# Patient Record
Sex: FEMALE | Race: BLACK OR AFRICAN AMERICAN | Employment: UNEMPLOYED | URBAN - METROPOLITAN AREA
[De-identification: names, ages, dates, MRNs, and addresses within clinical notes are randomized per-mention and may not be internally consistent; named-entity substitution may affect disease eponyms.]

---

## 2017-03-22 ENCOUNTER — HOSPITAL ENCOUNTER (EMERGENCY)
Age: 52
Discharge: HOME | End: 2017-03-23
Attending: EMERGENCY MEDICINE
Payer: SELF-PAY

## 2017-03-22 VITALS
HEART RATE: 98 BPM | OXYGEN SATURATION: 95 % | TEMPERATURE: 98.7 F | RESPIRATION RATE: 16 BRPM | SYSTOLIC BLOOD PRESSURE: 120 MMHG | DIASTOLIC BLOOD PRESSURE: 78 MMHG

## 2017-03-22 DIAGNOSIS — N64.4 PAINFUL BREASTS: Primary | ICD-10-CM

## 2017-03-22 DIAGNOSIS — E87.6 HYPOKALEMIA: ICD-10-CM

## 2017-03-22 LAB
ANION GAP BLD CALC-SCNC: 10 MMOL/L (ref 3–18)
BASOPHILS # BLD AUTO: 0 K/UL (ref 0–0.1)
BASOPHILS # BLD: 0 % (ref 0–2)
BUN SERPL-MCNC: 13 MG/DL (ref 7–18)
BUN/CREAT SERPL: 17 (ref 12–20)
CALCIUM SERPL-MCNC: 9.1 MG/DL (ref 8.5–10.1)
CHLORIDE SERPL-SCNC: 99 MMOL/L (ref 100–108)
CK MB CFR SERPL CALC: NORMAL % (ref 0–4)
CK MB SERPL-MCNC: <1 NG/ML (ref 5–25)
CK SERPL-CCNC: 112 U/L (ref 26–192)
CO2 SERPL-SCNC: 27 MMOL/L (ref 21–32)
CREAT SERPL-MCNC: 0.75 MG/DL (ref 0.6–1.3)
DIFFERENTIAL METHOD BLD: NORMAL
EOSINOPHIL # BLD: 0.1 K/UL (ref 0–0.4)
EOSINOPHIL NFR BLD: 2 % (ref 0–5)
ERYTHROCYTE [DISTWIDTH] IN BLOOD BY AUTOMATED COUNT: 13 % (ref 11.6–14.5)
GLUCOSE SERPL-MCNC: 264 MG/DL (ref 74–99)
HCT VFR BLD AUTO: 42 % (ref 35–45)
HGB BLD-MCNC: 14.3 G/DL (ref 12–16)
LYMPHOCYTES # BLD AUTO: 43 % (ref 21–52)
LYMPHOCYTES # BLD: 2.2 K/UL (ref 0.9–3.6)
MCH RBC QN AUTO: 31 PG (ref 24–34)
MCHC RBC AUTO-ENTMCNC: 34 G/DL (ref 31–37)
MCV RBC AUTO: 90.9 FL (ref 74–97)
MONOCYTES # BLD: 0.3 K/UL (ref 0.05–1.2)
MONOCYTES NFR BLD AUTO: 6 % (ref 3–10)
NEUTS SEG # BLD: 2.5 K/UL (ref 1.8–8)
NEUTS SEG NFR BLD AUTO: 49 % (ref 40–73)
PLATELET # BLD AUTO: 214 K/UL (ref 135–420)
PMV BLD AUTO: 10.7 FL (ref 9.2–11.8)
POTASSIUM SERPL-SCNC: 2.9 MMOL/L (ref 3.5–5.5)
RBC # BLD AUTO: 4.62 M/UL (ref 4.2–5.3)
SODIUM SERPL-SCNC: 136 MMOL/L (ref 136–145)
TROPONIN I SERPL-MCNC: <0.02 NG/ML (ref 0–0.04)
WBC # BLD AUTO: 5.1 K/UL (ref 4.6–13.2)

## 2017-03-22 PROCEDURE — 85025 COMPLETE CBC W/AUTO DIFF WBC: CPT | Performed by: EMERGENCY MEDICINE

## 2017-03-22 PROCEDURE — 99284 EMERGENCY DEPT VISIT MOD MDM: CPT

## 2017-03-22 PROCEDURE — 93005 ELECTROCARDIOGRAM TRACING: CPT

## 2017-03-22 PROCEDURE — 80048 BASIC METABOLIC PNL TOTAL CA: CPT | Performed by: EMERGENCY MEDICINE

## 2017-03-22 PROCEDURE — 82550 ASSAY OF CK (CPK): CPT | Performed by: EMERGENCY MEDICINE

## 2017-03-22 PROCEDURE — 74011250637 HC RX REV CODE- 250/637: Performed by: NURSE PRACTITIONER

## 2017-03-22 RX ORDER — LANOLIN ALCOHOL/MO/W.PET/CERES
400 CREAM (GRAM) TOPICAL
Status: COMPLETED | OUTPATIENT
Start: 2017-03-22 | End: 2017-03-22

## 2017-03-22 RX ORDER — IBUPROFEN 400 MG/1
800 TABLET ORAL
Status: COMPLETED | OUTPATIENT
Start: 2017-03-22 | End: 2017-03-22

## 2017-03-22 RX ORDER — POTASSIUM CHLORIDE 20 MEQ/1
40 TABLET, EXTENDED RELEASE ORAL
Status: COMPLETED | OUTPATIENT
Start: 2017-03-22 | End: 2017-03-22

## 2017-03-22 RX ADMIN — IBUPROFEN 800 MG: 400 TABLET, FILM COATED ORAL at 23:29

## 2017-03-22 RX ADMIN — POTASSIUM CHLORIDE 40 MEQ: 1500 TABLET, EXTENDED RELEASE ORAL at 23:29

## 2017-03-22 RX ADMIN — Medication 400 MG: at 23:29

## 2017-03-23 LAB
ATRIAL RATE: 105 BPM
ATRIAL RATE: 63 BPM
CALCULATED P AXIS, ECG09: 150 DEGREES
CALCULATED P AXIS, ECG09: 69 DEGREES
CALCULATED R AXIS, ECG10: -24 DEGREES
CALCULATED R AXIS, ECG10: 42 DEGREES
CALCULATED T AXIS, ECG11: 148 DEGREES
CALCULATED T AXIS, ECG11: 66 DEGREES
DIAGNOSIS, 93000: NORMAL
DIAGNOSIS, 93000: NORMAL
P-R INTERVAL, ECG05: 166 MS
P-R INTERVAL, ECG05: 168 MS
Q-T INTERVAL, ECG07: 374 MS
Q-T INTERVAL, ECG07: 418 MS
QRS DURATION, ECG06: 84 MS
QRS DURATION, ECG06: 92 MS
QTC CALCULATION (BEZET), ECG08: 427 MS
QTC CALCULATION (BEZET), ECG08: 494 MS
VENTRICULAR RATE, ECG03: 105 BPM
VENTRICULAR RATE, ECG03: 63 BPM

## 2017-03-23 RX ORDER — POTASSIUM CHLORIDE 20 MEQ/1
20 TABLET, EXTENDED RELEASE ORAL 3 TIMES DAILY
Qty: 9 TAB | Refills: 0 | Status: SHIPPED | OUTPATIENT
Start: 2017-03-23 | End: 2017-03-26

## 2017-03-23 RX ORDER — IBUPROFEN 800 MG/1
800 TABLET ORAL EVERY 8 HOURS
Qty: 15 TAB | Refills: 0 | Status: SHIPPED | OUTPATIENT
Start: 2017-03-23 | End: 2017-03-28

## 2017-03-23 NOTE — ED PROVIDER NOTES
HPI Comments: Pt with history of bipolar d/o and anxiety presents with the CC of burning pain to her bilateral breasts x 2 weeks. Pt has been seen by PCP yesterday for same who has scheduled a mammogram. Pt denies and CP or SOA, swelling. No other complaints at this time. Past Medical History:   Diagnosis Date    Anemia     Anemia NEC     Arthritis     Asthma     Chest pain, unspecified     Possible GERD, normal NUC Stress test    Chronic diastolic heart failure (HCC)     Normal EF, DD    Essential hypertension, benign     Better controlled    Hypercholesteremia     Hypertension     Migraines     Other and unspecified hyperlipidemia     Shortness of breath     Possible CHF, asthma, COPD    Thromboembolus ([facility] [address]        Past Surgical History:   Procedure Laterality Date    HX GYN      4 c-sections         Family History:   Problem Relation Age of Onset    Heart Disease Other      positive family history for heart disease    Diabetes Mother     Cancer Mother     Cancer Father     Diabetes Sister     Diabetes Paternal Grandmother        Social History     Social History    Marital status:      Spouse name: N/A    Number of children: N/A    Years of education: N/A     Occupational History    Not on file. Social History Main Topics    Smoking status: Current Some Day Smoker    Smokeless tobacco: Not on file    Alcohol use No    Drug use: No      Comment: reports hx of crack, cocaine and thc \"5 years ago\"    Sexual activity: Not on file     Other Topics Concern    Not on file     Social History Narrative    ** Merged History Encounter **              ALLERGIES: Pcn [penicillins] and Penicillins    Review of Systems   Constitutional: Negative. Negative for chills and fever. Respiratory: Negative. Negative for cough, chest tightness, shortness of breath, wheezing and stridor. Cardiovascular: Negative. Negative for chest pain and palpitations.    Gastrointestinal: Negative. Negative for abdominal pain, diarrhea, nausea and vomiting. Musculoskeletal: Negative. Negative for back pain, gait problem, neck pain and neck stiffness. Neurological: Negative. Negative for dizziness, weakness and numbness. All other systems reviewed and are negative. Vitals:    03/22/17 2219   BP: 120/78   Pulse: 98   Resp: 16   Temp: 98.7 °F (37.1 °C)   SpO2: 95%            Physical Exam   Constitutional: She is oriented to person, place, and time. She appears well-developed and well-nourished. No distress. HENT:   Head: Normocephalic and atraumatic. Eyes: Conjunctivae and EOM are normal. Pupils are equal, round, and reactive to light. Neck: Normal range of motion. Neck supple. Cardiovascular: Normal rate, regular rhythm and normal heart sounds. Exam reveals no gallop and no friction rub. No murmur heard. Pulmonary/Chest: Effort normal and breath sounds normal. No respiratory distress. She has no wheezes. She has no rales. Bilateral breasts tendernes globally without erythema, induration, or palpable masses. No overlying rash. DOMENICO Esparza present for breast exam   Abdominal: Soft. Bowel sounds are normal. She exhibits no distension. There is no tenderness. Musculoskeletal: Normal range of motion. She exhibits no edema, tenderness or deformity. Neurological: She is alert and oriented to person, place, and time. She exhibits normal muscle tone. Coordination normal.   Skin: Skin is warm and dry. She is not diaphoretic. Psychiatric: Her behavior is normal.   Pt anxious   Nursing note and vitals reviewed. MDM  Number of Diagnoses or Management Options  Hypokalemia:   Painful breasts:   Diagnosis management comments: Initial EKG interp by Dr. Rolo Wolff- Nothing acute. Cardiac labs normal.Reiterated pt's need for mammogram. Treated her hypokalemia in ED and will write prescription. Ibuprofen for breast pain. Follow up with PCP.     Instructed pt not to take Ibuprofen and Diclofenac during the same day/treatment period         Amount and/or Complexity of Data Reviewed  Clinical lab tests: ordered and reviewed    Risk of Complications, Morbidity, and/or Mortality  Presenting problems: moderate  Diagnostic procedures: moderate  Management options: moderate    Patient Progress  Patient progress: stable    ED Course       Procedures                       Diagnosis:   1. Painful breasts    2. Hypokalemia          Disposition: home      Follow-up Information     Follow up With Details Comments [address], NP In 2 days      [facility] EM[facility]ENCY DEPT  If symptoms worsen [address] [place] [address]  [phone]          Patient's Medications   Start Taking    IBUPROFEN (MOTRIN) 800 MG TABLET    Take 1 Tab by mouth every eight (8) hours for 5 days. POTASSIUM CHLORIDE (K-DUR, KLOR-CON) 20 MEQ TABLET    Take 1 Tab by mouth three (3) times daily for 3 days. Continue Taking    ALBUTEROL (PROVENTIL VENTOLIN) 2.5 MG /3 ML (0.083 %) NEBULIZER SOLUTION    5 mg by Nebulization route once. CLONIDINE (CATAPRES) 0.1 MG TABLET    Take 0.1 mg by mouth two (2) times a day. CYCLOBENZAPRINE HCL (CYCLOBENZAPRINE PO)    Take  by mouth. FERROUS SULF-FA-B COMP WITH C (MULTIRET FOLIC) 370 (912)-7.7 MG TBER    Take 1 Tab by mouth daily. FLUTICASONE-SALMETEROL (ADVAIR) 100-50 MCG/DOSE DISKUS INHALER    Take 1 Puff by inhalation every twelve (12) hours. FUROSEMIDE (LASIX) 40 MG TABLET    Take  by mouth daily. INSULIN NPH/INSULIN REGULAR (NOVOLIN 70/30, HUMULIN 70/30) 100 UNIT/ML (70-30) INJECTION    by SubCUTAneous route. LISINOPRIL (PRINIVIL, ZESTRIL) 5 MG TABLET    Take  by mouth daily. METFORMIN (GLUCOPHAGE) 500 MG TABLET    Take 1 Tab by mouth daily (with breakfast). OLMESARTAN-AMLODIPINE-HCTZ (TRIBENZOR) 40-5-12.5 MG TAB    Take  by mouth.     OXYCODONE-ACETAMINOPHEN (PERCOCET) 5-325 MG PER TABLET    Take 1 Tab by mouth every four (4) hours as needed for Pain. PREDNISONE (STERAPRED) 5 MG DOSE PACK    See administration instruction per 5mg dose pack    SIMVASTATIN PO    Take  by mouth. These Medications have changed    No medications on file   Stop Taking    DICLOFENAC POTASSIUM (CATAFLAM) 50 MG TABLET    Take 50 mg by mouth three (3) times daily.

## 2017-03-23 NOTE — DISCHARGE INSTRUCTIONS
Breast Pain: Care Instructions  Your Care Instructions  Breast tenderness and pain may come and go with your monthly periods (cyclic), or it may not follow any pattern (noncyclic). Breast pain is rarely caused by a serious health problem. You may need tests to find the cause. Follow-up care is a key part of your treatment and safety. Be sure to make and go to all appointments, and call your doctor if you are having problems. Its also a good idea to know your test results and keep a list of the medicines you take. How can you care for yourself at home? · If your doctor gave you medicine, take it exactly as prescribed. Call your doctor if you think you are having a problem with your medicine. · Take an over-the-counter pain medicine, such as acetaminophen (Tylenol), ibuprofen (Advil, Motrin), or naproxen (Aleve), to relieve pain and swelling. Read and follow all instructions on the label. · Do not take two or more pain medicines at the same time unless the doctor told you to. Many pain medicines have acetaminophen, which is Tylenol. Too much acetaminophen (Tylenol) can be harmful. · Wear a supportive bra, such as a sports bra or a jog bra. · Cut down on the amount of fat in your diet. If you need help planning healthy meals, see a dietitian. · Get at least 30 minutes of exercise on most days of the week. Walking is a good choice. You also may want to do other activities, such as running, swimming, cycling, or playing tennis or team sports. · Keep a healthy sleep pattern. Go to bed at the same time every night, and get up at the same time every day. When should you call for help? Call your doctor now or seek immediate medical care if:  · You have new changes in a breast, such as:  ¨ A lump or thickening in your breast or armpit. ¨ A change in the breast's size or shape. ¨ Skin changes, such as dimples or puckers. ¨ Nipple discharge.   ¨ A change in the color or feel of the skin of your breast or the darker area around the nipple (areola). ¨ A change in the shape of the nipple (it may look like it's being pulled into the breast). · You have symptoms of a breast infection, such as:  ¨ Increased pain, swelling, redness, or warmth around a breast.  ¨ Red streaks extending from the breast.  ¨ Pus draining from a breast.  ¨ A fever. Watch closely for changes in your health, and be sure to contact your doctor if:  · Your breast pain does not get better after 1 week. · You have a lump or thickening in your breast or armpit. Where can you learn more? Go to http://rashawn-bre.info/. Enter P301 in the search box to learn more about \"Breast Pain: Care Instructions. \"  Current as of: May 27, 2016  Content Version: 11.1  © 9604-5161 [employer]. Care instructions adapted under license by [employer] (which disclaims liability or warranty for this information). If you have questions about a medical condition or this instruction, always ask your healthcare professional. [place] [address] any warranty or liability for your use of this information. Mammogram: About This Test  What is it? A mammogram is an X-ray of the breast that is used to screen for breast cancer. This test can find tumors that are too small for you or your doctor to feel. Cancer is most easily treated and cured when it is found at an early stage. Why is this test done? A mammogram is done to:  · Look for breast cancer in women who don't have symptoms. · Find breast cancer in women who have symptoms. Symptoms of breast cancer may include a lump or thickening in the breast, nipple discharge, or dimpling of the skin on one area of the breast.  · Find an area of suspicious breast tissue to remove for an exam under a microscope (biopsy). How can you prepare for the test?  · Tell your doctor if you:  ¨ Are or might be pregnant. ¨ Are breastfeeding. ¨ Have breast implants.   ¨ Have previously had a breast biopsy. · On the day of the test, don't use any deodorant, perfume, powders, or ointments. What happens before the test?  · You will need to take off any jewelry that might interfere with the X-ray pictures. · You will need to take off your clothes above the waist.  · You will be given a cloth or paper gown to use during the test.  What happens during the test?  · You usually stand during a mammogram.  · One at a time, your breasts will be placed on a flat plate that contains the X-ray film. · Another plate is then pressed firmly against your breast to help flatten out the breast tissue. You may be asked to lift your arm. · For a few seconds while the X-ray picture is being taken, you will need to hold your breath. · At least two pictures are taken of each breast. One is taken from the top and one from the side. What else should you know about the test?  · The X-ray plate will feel cold when you place your breast on it. Having your breasts flattened and squeezed isn't comfortable. But it is necessary to flatten out the breast tissue to get the best pictures. · Mammograms do not prevent breast cancer or reduce a woman's risk of developing cancer. · Most things that are found during a mammogram are not breast cancer. How long does the test take? · The test will take about 10 to 15 minutes. You may be in the clinic for up to an hour. What happens after the test?  · You will probably be able to go home right away. · You can go back to your usual activities right away. Follow-up care is a key part of your treatment and safety. Be sure to make and go to all appointments, and call your doctor if you are having problems. It's also a good idea to keep a list of the medicines you take. Ask your doctor when you can expect to have your test results. Where can you learn more? Go to http://rashawn-bre.info/.   Enter K480 in the search box to learn more about \"Mammogram: About This Test.\"  Current as of: July 26, 2016  Content Version: 11.1  © 2158-7540 [employer]. Care instructions adapted under license by [employer] (which disclaims liability or warranty for this information). If you have questions about a medical condition or this instruction, always ask your healthcare professional. [place] [address] any warranty or liability for your use of this information.

## 2018-04-17 ENCOUNTER — APPOINTMENT (OUTPATIENT)
Dept: CT IMAGING | Age: 53
DRG: 065 | End: 2018-04-17
Attending: EMERGENCY MEDICINE
Payer: SELF-PAY

## 2018-04-17 ENCOUNTER — HOSPITAL ENCOUNTER (INPATIENT)
Age: 53
LOS: 2 days | Discharge: HOME | DRG: 065 | End: 2018-04-19
Attending: EMERGENCY MEDICINE | Admitting: INTERNAL MEDICINE
Payer: SELF-PAY

## 2018-04-17 ENCOUNTER — APPOINTMENT (OUTPATIENT)
Dept: MRI IMAGING | Age: 53
DRG: 065 | End: 2018-04-17
Attending: INTERNAL MEDICINE
Payer: SELF-PAY

## 2018-04-17 DIAGNOSIS — I63.9 CEREBROVASCULAR ACCIDENT (CVA), UNSPECIFIED MECHANISM (HCC): Primary | ICD-10-CM

## 2018-04-17 PROBLEM — R53.1 RIGHT SIDED WEAKNESS: Status: ACTIVE | Noted: 2018-04-17

## 2018-04-17 PROBLEM — I10 ESSENTIAL HYPERTENSION: Status: ACTIVE | Noted: 2018-04-17

## 2018-04-17 PROBLEM — E11.65 TYPE 2 DIABETES MELLITUS WITH HYPERGLYCEMIA (HCC): Status: ACTIVE | Noted: 2018-04-17

## 2018-04-17 LAB
ANION GAP SERPL CALC-SCNC: 7 MMOL/L (ref 3–18)
ATRIAL RATE: 88 BPM
BASOPHILS # BLD: 0 K/UL (ref 0–0.06)
BASOPHILS NFR BLD: 0 % (ref 0–2)
BUN SERPL-MCNC: 14 MG/DL (ref 7–18)
BUN/CREAT SERPL: 17 (ref 12–20)
CALCIUM SERPL-MCNC: 9.6 MG/DL (ref 8.5–10.1)
CALCULATED P AXIS, ECG09: 58 DEGREES
CALCULATED R AXIS, ECG10: 4 DEGREES
CALCULATED T AXIS, ECG11: 79 DEGREES
CHLORIDE SERPL-SCNC: 105 MMOL/L (ref 100–108)
CO2 SERPL-SCNC: 29 MMOL/L (ref 21–32)
CREAT SERPL-MCNC: 0.82 MG/DL (ref 0.6–1.3)
DIAGNOSIS, 93000: NORMAL
DIFFERENTIAL METHOD BLD: NORMAL
EOSINOPHIL # BLD: 0 K/UL (ref 0–0.4)
EOSINOPHIL NFR BLD: 1 % (ref 0–5)
ERYTHROCYTE [DISTWIDTH] IN BLOOD BY AUTOMATED COUNT: 14.5 % (ref 11.6–14.5)
GLUCOSE BLD STRIP.AUTO-MCNC: 132 MG/DL (ref 70–110)
GLUCOSE BLD STRIP.AUTO-MCNC: 143 MG/DL (ref 70–110)
GLUCOSE BLD STRIP.AUTO-MCNC: 183 MG/DL (ref 70–110)
GLUCOSE SERPL-MCNC: 182 MG/DL (ref 74–99)
HCT VFR BLD AUTO: 41.8 % (ref 35–45)
HGB BLD-MCNC: 15 G/DL (ref 12–16)
INR PPP: 0.9 (ref 0.8–1.2)
LYMPHOCYTES # BLD: 2.3 K/UL (ref 0.9–3.6)
LYMPHOCYTES NFR BLD: 39 % (ref 21–52)
MCH RBC QN AUTO: 31.6 PG (ref 24–34)
MCHC RBC AUTO-ENTMCNC: 35.9 G/DL (ref 31–37)
MCV RBC AUTO: 88 FL (ref 74–97)
MONOCYTES # BLD: 0.2 K/UL (ref 0.05–1.2)
MONOCYTES NFR BLD: 4 % (ref 3–10)
NEUTS SEG # BLD: 3.3 K/UL (ref 1.8–8)
NEUTS SEG NFR BLD: 56 % (ref 40–73)
P-R INTERVAL, ECG05: 160 MS
PLATELET # BLD AUTO: 248 K/UL (ref 135–420)
PMV BLD AUTO: 10 FL (ref 9.2–11.8)
POTASSIUM SERPL-SCNC: 4.1 MMOL/L (ref 3.5–5.5)
PROTHROMBIN TIME: 12.1 SEC (ref 11.5–15.2)
Q-T INTERVAL, ECG07: 398 MS
QRS DURATION, ECG06: 82 MS
QTC CALCULATION (BEZET), ECG08: 481 MS
RBC # BLD AUTO: 4.75 M/UL (ref 4.2–5.3)
SODIUM SERPL-SCNC: 141 MMOL/L (ref 136–145)
VENTRICULAR RATE, ECG03: 88 BPM
WBC # BLD AUTO: 5.8 K/UL (ref 4.6–13.2)

## 2018-04-17 PROCEDURE — 74011250637 HC RX REV CODE- 250/637: Performed by: EMERGENCY MEDICINE

## 2018-04-17 PROCEDURE — 65660000000 HC RM CCU STEPDOWN

## 2018-04-17 PROCEDURE — 85610 PROTHROMBIN TIME: CPT | Performed by: EMERGENCY MEDICINE

## 2018-04-17 PROCEDURE — 70450 CT HEAD/BRAIN W/O DYE: CPT

## 2018-04-17 PROCEDURE — 96374 THER/PROPH/DIAG INJ IV PUSH: CPT

## 2018-04-17 PROCEDURE — 82962 GLUCOSE BLOOD TEST: CPT

## 2018-04-17 PROCEDURE — 85025 COMPLETE CBC W/AUTO DIFF WBC: CPT | Performed by: EMERGENCY MEDICINE

## 2018-04-17 PROCEDURE — 99285 EMERGENCY DEPT VISIT HI MDM: CPT

## 2018-04-17 PROCEDURE — 74011250637 HC RX REV CODE- 250/637: Performed by: INTERNAL MEDICINE

## 2018-04-17 PROCEDURE — 80048 BASIC METABOLIC PNL TOTAL CA: CPT | Performed by: EMERGENCY MEDICINE

## 2018-04-17 PROCEDURE — 93970 EXTREMITY STUDY: CPT

## 2018-04-17 PROCEDURE — 74011250636 HC RX REV CODE- 250/636: Performed by: EMERGENCY MEDICINE

## 2018-04-17 PROCEDURE — 93005 ELECTROCARDIOGRAM TRACING: CPT

## 2018-04-17 PROCEDURE — 74011250636 HC RX REV CODE- 250/636: Performed by: INTERNAL MEDICINE

## 2018-04-17 RX ORDER — SODIUM CHLORIDE 0.9 % (FLUSH) 0.9 %
5-10 SYRINGE (ML) INJECTION EVERY 8 HOURS
Status: DISCONTINUED | OUTPATIENT
Start: 2018-04-17 | End: 2018-04-19 | Stop reason: HOSPADM

## 2018-04-17 RX ORDER — SODIUM CHLORIDE 0.9 % (FLUSH) 0.9 %
5-10 SYRINGE (ML) INJECTION AS NEEDED
Status: DISCONTINUED | OUTPATIENT
Start: 2018-04-17 | End: 2018-04-19 | Stop reason: HOSPADM

## 2018-04-17 RX ORDER — MAGNESIUM SULFATE 100 %
4 CRYSTALS MISCELLANEOUS AS NEEDED
Status: DISCONTINUED | OUTPATIENT
Start: 2018-04-17 | End: 2018-04-19 | Stop reason: HOSPADM

## 2018-04-17 RX ORDER — MIDAZOLAM HYDROCHLORIDE 1 MG/ML
2 INJECTION, SOLUTION INTRAMUSCULAR; INTRAVENOUS ONCE
Status: COMPLETED | OUTPATIENT
Start: 2018-04-17 | End: 2018-04-17

## 2018-04-17 RX ORDER — ONDANSETRON 2 MG/ML
4 INJECTION INTRAMUSCULAR; INTRAVENOUS
Status: DISCONTINUED | OUTPATIENT
Start: 2018-04-17 | End: 2018-04-19 | Stop reason: HOSPADM

## 2018-04-17 RX ORDER — DOCUSATE SODIUM 100 MG/1
100 CAPSULE, LIQUID FILLED ORAL 2 TIMES DAILY
Status: DISCONTINUED | OUTPATIENT
Start: 2018-04-17 | End: 2018-04-19 | Stop reason: HOSPADM

## 2018-04-17 RX ORDER — ASPIRIN 325 MG
325 TABLET ORAL DAILY
Status: DISCONTINUED | OUTPATIENT
Start: 2018-04-18 | End: 2018-04-19 | Stop reason: HOSPADM

## 2018-04-17 RX ORDER — FAMOTIDINE 20 MG/1
20 TABLET, FILM COATED ORAL EVERY 12 HOURS
Status: DISCONTINUED | OUTPATIENT
Start: 2018-04-17 | End: 2018-04-19 | Stop reason: HOSPADM

## 2018-04-17 RX ORDER — ALBUTEROL SULFATE 0.83 MG/ML
5 SOLUTION RESPIRATORY (INHALATION)
Status: DISCONTINUED | OUTPATIENT
Start: 2018-04-17 | End: 2018-04-19 | Stop reason: HOSPADM

## 2018-04-17 RX ORDER — HEPARIN SODIUM 5000 [USP'U]/ML
5000 INJECTION, SOLUTION INTRAVENOUS; SUBCUTANEOUS EVERY 8 HOURS
Status: DISCONTINUED | OUTPATIENT
Start: 2018-04-17 | End: 2018-04-19 | Stop reason: HOSPADM

## 2018-04-17 RX ORDER — SODIUM CHLORIDE 9 MG/ML
125 INJECTION, SOLUTION INTRAVENOUS CONTINUOUS
Status: DISCONTINUED | OUTPATIENT
Start: 2018-04-17 | End: 2018-04-19

## 2018-04-17 RX ORDER — ASPIRIN 325 MG
325 TABLET ORAL
Status: COMPLETED | OUTPATIENT
Start: 2018-04-17 | End: 2018-04-17

## 2018-04-17 RX ORDER — DEXTROSE 50 % IN WATER (D50W) INTRAVENOUS SYRINGE
25-50 AS NEEDED
Status: DISCONTINUED | OUTPATIENT
Start: 2018-04-17 | End: 2018-04-19 | Stop reason: HOSPADM

## 2018-04-17 RX ORDER — ACETAMINOPHEN 650 MG/1
650 SUPPOSITORY RECTAL
Status: DISCONTINUED | OUTPATIENT
Start: 2018-04-17 | End: 2018-04-19 | Stop reason: HOSPADM

## 2018-04-17 RX ORDER — LABETALOL HCL 20 MG/4 ML
5 SYRINGE (ML) INTRAVENOUS
Status: DISCONTINUED | OUTPATIENT
Start: 2018-04-17 | End: 2018-04-19 | Stop reason: HOSPADM

## 2018-04-17 RX ORDER — SODIUM CHLORIDE 9 MG/ML
500 INJECTION, SOLUTION INTRAVENOUS ONCE
Status: ACTIVE | OUTPATIENT
Start: 2018-04-17 | End: 2018-04-18

## 2018-04-17 RX ORDER — ACETAMINOPHEN 325 MG/1
650 TABLET ORAL
Status: DISCONTINUED | OUTPATIENT
Start: 2018-04-17 | End: 2018-04-19 | Stop reason: HOSPADM

## 2018-04-17 RX ORDER — ATORVASTATIN CALCIUM 40 MG/1
80 TABLET, FILM COATED ORAL
Status: DISCONTINUED | OUTPATIENT
Start: 2018-04-17 | End: 2018-04-19 | Stop reason: HOSPADM

## 2018-04-17 RX ORDER — INSULIN LISPRO 100 [IU]/ML
INJECTION, SOLUTION INTRAVENOUS; SUBCUTANEOUS
Status: DISCONTINUED | OUTPATIENT
Start: 2018-04-17 | End: 2018-04-19 | Stop reason: HOSPADM

## 2018-04-17 RX ADMIN — FAMOTIDINE 20 MG: 20 TABLET, FILM COATED ORAL at 23:23

## 2018-04-17 RX ADMIN — ASPIRIN 325 MG: 325 TABLET ORAL at 09:46

## 2018-04-17 RX ADMIN — MIDAZOLAM HYDROCHLORIDE 2 MG: 1 INJECTION, SOLUTION INTRAMUSCULAR; INTRAVENOUS at 09:35

## 2018-04-17 RX ADMIN — Medication 10 ML: at 16:25

## 2018-04-17 RX ADMIN — SODIUM CHLORIDE 125 ML/HR: 900 INJECTION, SOLUTION INTRAVENOUS at 16:14

## 2018-04-17 RX ADMIN — ATORVASTATIN CALCIUM 80 MG: 40 TABLET, FILM COATED ORAL at 23:23

## 2018-04-17 RX ADMIN — HEPARIN SODIUM 5000 UNITS: 5000 INJECTION, SOLUTION INTRAVENOUS; SUBCUTANEOUS at 16:25

## 2018-04-17 NOTE — ROUTINE PROCESS
Primary Nurse Martha Holbrook RN and Valentino Hurst, RN performed a dual skin assessment on this patient No impairment noted  Baron score is 20

## 2018-04-17 NOTE — H&P
History & Physical    Patient: Marily León MRN: [id number]  CSN: [id number]    YOB: 1965  Age: 45 y.o. Sex: female      DOA: 4/17/2018    Chief Complaint:   Chief Complaint   Patient presents with    Hand Pain    Arm Pain          HPI:     Marily León is a 45 y.o.  female who has PMH of HTN and DM was in her normal state of health when she woke up to use the bathroom but felt extremely weak and fell to her Rt sided and lost control of her bladder. Pt states that she crawled back to bed and her  called 911. As per Pt and , Pt had U&L ext weakness, slurred speech and inability to express herself.    In ER, Pt gained her speech back but continues to Have Upper Ext weakness and worse LE weakness with tenderness on passive MVT     Denies CP/SOB/fever/abdominal pain and urinary Sx but continues to c/o pain and weakness Rt U&L ext      Past Medical History:   Diagnosis Date    Anemia     Anemia NEC     Arthritis     Asthma     Chest pain, unspecified     Possible GERD, normal NUC Stress test    Chronic diastolic heart failure (HCC)     Normal EF, DD    Essential hypertension, benign     Better controlled    Hypercholesteremia     Hypertension     Migraines     Other and unspecified hyperlipidemia     Shortness of breath     Possible CHF, asthma, COPD    Thromboembolus ([address]        Past Surgical History:   Procedure Laterality Date    HX GYN      4 c-sections       Family History   Problem Relation Age of Onset    Heart Disease Other      positive family history for heart disease    Diabetes Mother     Cancer Mother     Cancer Father     Diabetes Sister     Diabetes Paternal Grandmother        Social History     Social History    Marital status: SINGLE     Spouse name: N/A    Number of children: N/A    Years of education: N/A     Social History Main Topics    Smoking status: Current Some Day Smoker    Smokeless tobacco: Not on file   Jeffrey Alcohol use No    Drug use: No      Comment: reports hx of crack, cocaine and thc \"5 years ago\"    Sexual activity: Not on file     Other Topics Concern    Not on file     Social History Narrative    ** Merged History Encounter **            Prior to Admission medications    Medication Sig Start Date End Date Taking? Authorizing Provider   CYCLOBENZAPRINE HCL (CYCLOBENZAPRINE PO) Take  by mouth. Yes Historical Provider   SIMVASTATIN PO Take  by mouth. Yes Historical Provider   fluticasone-salmeterol (ADVAIR) 100-50 mcg/dose diskus inhaler Take 1 Puff by inhalation every twelve (12) hours. Yes Historical Provider   insulin NPH/insulin regular (NOVOLIN 70/30, HUMULIN 70/30) 100 unit/mL (70-30) injection by SubCUTAneous route. Yes Historical Provider   metFORMIN (GLUCOPHAGE) 500 mg tablet Take 1 Tab by mouth daily (with breakfast). 4/12/15  Yes Violeta May MD   Olmesartan-Amlodipine-HCTZ [facility]) 40-5-12.5 mg Tab Take  by mouth. Yes Mora Morelos MD   predniSONE (STERAPRED) 5 mg dose pack See administration instruction per 5mg dose pack 10/27/13  Yes MATT Rivera   lisinopril (PRINIVIL, ZESTRIL) 5 mg tablet Take  by mouth daily. Yes Historical Provider   furosemide (LASIX) 40 mg tablet Take  by mouth daily. Yes Historical Provider   ferrous sulf-fa-b comp with c (MULTIRET FOLIC) 787 (709)-4.6 mg TbER Take 1 Tab by mouth daily. 1/9/13  Yes Zayra Johnson MD   albuterol (PROVENTIL VENTOLIN) 2.5 mg /3 mL (0.083 %) nebulizer solution 5 mg by Nebulization route once. Yes Mora Morelos MD   cloNIDine (CATAPRES) 0.1 mg tablet Take 0.1 mg by mouth two (2) times a day. Yes Mora Morelos MD   oxyCODONE-acetaminophen (PERCOCET) 5-325 mg per tablet Take 1 Tab by mouth every four (4) hours as needed for Pain.  7/11/14   Macknezie Perez MD       Allergies   Allergen Reactions    Pcn [Penicillins] Hives and Swelling    Penicillins Hives         Review of Systems  GENERAL: Patient alert, awake and oriented times 3, able to communicate full sentences . Claims difficulty finding words  HEENT: No change in vision, no earache, tinnitus, sore throat or sinus congestion. NECK: No pain or stiffness. PULMONARY: No shortness of breath, cough or wheeze. Cardiovascular: no pnd / orthopnea, no CP  GASTROINTESTINAL: No abdominal pain, nausea, vomiting or diarrhea, melena or bright red blood per rectum. GENITOURINARY: No urinary frequency, urgency, hesitancy or dysuria. MUSCULOSKELETAL: as stated in HPI  DERMATOLOGIC: No rash, no itching, no lesions. ENDOCRINE: No polyuria, polydipsia, no heat or cold intolerance. No recent change in weight. HEMATOLOGICAL: No anemia or easy bruising or bleeding. NEUROLOGIC: +ve headache, no seizures, numbness, +ve tingling      Physical Exam:     Physical Exam:  Visit Vitals    /84 (BP 1 Location: Left arm, BP Patient Position: At rest)    Pulse 92    Temp 97.7 °F (36.5 °C)  Comment: pt arrived on unit    Resp 16    Ht 5' 2\" (1.575 m)    Wt 91.2 kg (201 lb)    SpO2 92%    BMI 36.76 kg/m2           Temp (24hrs), Av.8 °F (36.6 °C), Min:97.7 °F (36.5 °C), Max:97.8 °F (36.6 °C)             General:  Alert, cooperative, no distress, appears stated age. Head: Normocephalic, without obvious abnormality, atraumatic. Eyes:  Conjunctivae/corneas clear. PERRL, EOMs intact. Nose: Nares normal. No drainage or sinus tenderness. Neck: Supple, symmetrical, trachea midline, no adenopathy, thyroid: no enlargement, no carotid bruit and no JVD. Lungs:   Clear to auscultation bilaterally. Heart:  Regular rate and rhythm, S1, S2 normal.     Abdomen: Soft, non-tender. Bowel sounds normal.    Extremities: Rt U&L Ext weakness and tenderness    Pulses: 2+ and symmetric all extremities. Skin:  No rashes or lesions   Neurologic: AAOx3, generally weak. Mild difficulty finding words. Painful and weakness LE       Labs Reviewed:     All lab results for the last 24 hours reviewed.   CT and EKG    Procedures/imaging: see electronic medical records for all procedures/Xrays and details which were not copied into this note but were reviewed prior to creation of Plan      Assessment/Plan     Principal Problem:    CVA (cerebral vascular accident) ([facility] [address] (4/17/2018)    Active Problems:    Right sided weakness (4/17/2018)      Essential hypertension (4/17/2018)      Type 2 diabetes mellitus with hyperglycemia ([facility] [address] (4/17/2018)      Pt will be admitted to Neuro tele for Acute CVA  MRI brain  2 D Echo   mg daily and statin  Permissive HTN>> Hold BP meds  Labetalol 5 mg IV PRN  Neuro consult  SLP evel and treat   Lipid panel  Will get Drug screen     Will get LE doppler to r/o DVT considering Pt 's pain  DM >> will hold home meds >> SSI      DVT/GI Prophylaxis: Hep SQ    Plan of care is discussed in details with Patient/Family at bedside and agreed upon    Elsy Torres MD  4/17/2018 11:48 AM

## 2018-04-17 NOTE — PROGRESS NOTES
MRI Safety Screening form needs to be filled out and faxed to [phone] MRI can be scheduled. If unable to acquire information from pt, [facility] must be contacted.  If pt is claustrophobic or will need pain meds, please have ordered in advance to help facilitate MRI exam.

## 2018-04-17 NOTE — ROUTINE PROCESS
Pt stated she could not move her right leg but could wiggle her toes during the previous NIH assessments and upon admission assessment. Upon entering pt room found pt moving from a laying to sitting position then back to laying on her side and asked her to raise her right leg. Pt raised the leg and held it up for approx 10 seconds. Will continue to monitor pt.

## 2018-04-17 NOTE — PROGRESS NOTES
responded to Code for  Ga Robles, who is a 45 y.o.,female,     The  provided the following Interventions:  Micheljazmin Gimenez responded to code S. Patient had been moved from ED to another area of the hospital for further medical evaluation. Patient's chart was reviewed. Patient was not found in the ED.  spoke with an acquaintance of the patient and encouraged him to call for support as needed by him or the patient. Assessment:  There are no known spiritual or Restorationist issues which require intervention at this time. Plan:  Chaplains will continue to follow and will provide pastoral care on an as needed/requested basis.  recommends bedside caregivers page  on duty if patient shows signs of acute spiritual or emotional distress.      [address] [place] [address]  Spiritual Care  [phone]

## 2018-04-17 NOTE — IP AVS SNAPSHOT
[address] [place] [address] Karina Velazquez Patient: Génesis Barrera MRN: [id number] SRB:[birth year]0350 A check myke indicates which time of day the medication should be taken. My Medications START taking these medications Instructions Each Dose to Equal  
 Morning Noon Evening Bedtime  
 aspirin 325 mg tablet Commonly known as:  ASPIRIN Start taking on:  4/20/2018 Your last dose was: Your next dose is: Take 1 Tab by mouth daily. 325 mg CONTINUE taking these medications Instructions Each Dose to Equal  
 Morning Noon Evening Bedtime  
 albuterol 2.5 mg /3 mL (0.083 %) nebulizer solution Commonly known as:  PROVENTIL VENTOLIN Your last dose was: Your next dose is:    
   
   
 5 mg by Nebulization route once. 5 mg CYCLOBENZAPRINE PO Your last dose was: Your next dose is: Take  by mouth. ferrous sulf-fa-b comp with c 525 (687)-0.8 mg Brett Leyva Commonly known as:  Key Officer Your last dose was: Your next dose is: Take 1 Tab by mouth daily. 1 Tab  
    
   
   
   
  
 fluticasone-salmeterol 100-50 mcg/dose diskus inhaler Commonly known as:  ADVAIR Your last dose was: Your next dose is: Take 1 Puff by inhalation every twelve (12) hours. 1 Puff  
    
   
   
   
  
 insulin NPH/insulin regular 100 unit/mL (70-30) injection Commonly known as:  NOVOLIN 70/30, HUMULIN 70/30 Your last dose was: Your next dose is:    
   
   
 by SubCUTAneous route. LASIX 40 mg tablet Generic drug:  furosemide Your last dose was: Your next dose is: Take  by mouth daily. lisinopril 5 mg tablet Commonly known as:  Mike Zuniga  
   
 Your last dose was: Your next dose is: Take  by mouth daily. metFORMIN 500 mg tablet Commonly known as:  GLUCOPHAGE Your last dose was: Your next dose is: Take 1 Tab by mouth daily (with breakfast). 500 mg  
    
   
   
   
  
 oxyCODONE-acetaminophen 5-325 mg per tablet Commonly known as:  PERCOCET Your last dose was: Your next dose is: Take 1 Tab by mouth every four (4) hours as needed for Pain. 1 Tab  
    
   
   
   
  
 SIMVASTATIN PO Your last dose was: Your next dose is: Take  by mouth. TRIBENZOR 40-5-12.5 mg Tab Generic drug:  Olmesartan-amLODIPine-HCTZ Your last dose was: Your next dose is: Take  by mouth. STOP taking these medications   
 cloNIDine HCl 0.1 mg tablet Commonly known as:  CATAPRES  
   
  
 predniSONE 5 mg dose pack Commonly known as:  STERAPRED Where to Get Your Medications Information on where to get these meds will be given to you by the nurse or doctor. ! Ask your nurse or doctor about these medications  
  aspirin 325 mg tablet

## 2018-04-17 NOTE — ED TRIAGE NOTES
Patient arrived from home c/o right arm pain and stiffness. Patient states this happened before and received injections. Per EMS patient negative for NIH stroke scale. Patient allergies up to date. Patient ambulates without assistance.

## 2018-04-17 NOTE — PROCEDURES
[facility]  *** [facility]AL [facility] ***    Name: Rochel Dandy  MRN: [id number]    Inpatient  : 15 Jul 1965  HIS Order #: [id number]  [id number] [facility] Visit #: [id number]  Date: 2018    TYPE OF TEST: Peripheral Venous Testing    REASON FOR TEST  Pain in limb, Limb Pain    Right Leg:-  Deep venous thrombosis:           No  Superficial venous thrombosis:    No  Deep venous insufficiency:        Not examined  Superficial venous insufficiency: Not examined    Left Leg:-  Deep venous thrombosis:           No  Superficial venous thrombosis:    No  Deep venous insufficiency:        Not examined  Superficial venous insufficiency: Not examined      INTERPRETATION/FINDINGS  Duplex images were obtained using 2-D gray scale, color flow, and  spectral Doppler analysis. Right leg :  1. No evidence of deep venous thrombosis detected in the veins  visualized. 2.Deep vein(s) visualized include the common femoral, femoral,  popliteal( posterior tibial and peroneal veins. 3. Superficial vein(s) visualized include the great saphenous vein at  the sapheno-femoral junction. 4. No evidence of superficial thrombosis detected. Left leg :  1. No evidence of deep venous thrombosis detected in the veins  visualized. 2. Deep vein(s) visualized include the common femoral, femoral,  popliteal, posterior tibial, and peroneal veins. 3. Unable to perform adequate compression analysis of the peroneal  veins secondary to body habitus. Patency of vessels demonstrated with  color flow and Doppler signal. However, cannot rule out non-occlusive  deep venous thrombosis at noted vein segment. 4. No evidence of superficial thrombosis detected. 5. Superficial vein(s) visualized include the great saphenous vein at  the sapheno-femoral junction. ADDITIONAL COMMENTS  No previous exam available for comparison. I have personally reviewed the data relevant to the interpretation of  this  study. TECHNOLOGIST: Jany Fonseca.  Chica Orosco  Signed: 04/17/2018 05:24 PM    PHYSICIAN: Annie Rubin MD  Signed: 04/17/2018 06:58 PM

## 2018-04-17 NOTE — ROUTINE PROCESS
Bedside and Verbal shift change report given to [facility] (oncoming nurse) by Barrera Polk (offgoing nurse). Report included the following information SBAR, Kardex, ED Summary, Intake/Output and MAR.

## 2018-04-17 NOTE — IP AVS SNAPSHOT
[id number] [address] Patient: James Rader MRN: [id number] HHL:[id number] About your hospitalization You were admitted on:  April 17, 2018 You last received care in the:  [facility] [address] You were discharged on:  April 19, 2018 Why you were hospitalized Your primary diagnosis was:  Cva (Cerebral Vascular Accident) (Hcc) Your diagnoses also included:  Right Sided Weakness, Essential Hypertension, Type 2 Diabetes Mellitus With Hyperglycemia (Hcc) Follow-up Information Follow up With Details Comments Contact Info Reanta Lopez MD On 4/23/2018 Mrs Kacy Veloz is only in on Saturday. Please call to be schedule for a same day follow up because nothing is available until June. [address] [address] 
[phone] Discharge Orders None A check myke indicates which time of day the medication should be taken. My Medications START taking these medications Instructions Each Dose to Equal  
 Morning Noon Evening Bedtime  
 aspirin 325 mg tablet Commonly known as:  ASPIRIN Start taking on:  4/20/2018 Your last dose was: Your next dose is: Take 1 Tab by mouth daily. 325 mg CONTINUE taking these medications Instructions Each Dose to Equal  
 Morning Noon Evening Bedtime  
 albuterol 2.5 mg /3 mL (0.083 %) nebulizer solution Commonly known as:  PROVENTIL VENTOLIN Your last dose was: Your next dose is:    
   
   
 5 mg by Nebulization route once. 5 mg CYCLOBENZAPRINE PO Your last dose was: Your next dose is: Take  by mouth. ferrous sulf-fa-b comp with c 525 (434)-0.8 mg Gayleen Rising Commonly known as:  Ben Battiest Your last dose was: Your next dose is: Take 1 Tab by mouth daily. 1 Tab  
    
   
   
   
  
 fluticasone-salmeterol 100-50 mcg/dose diskus inhaler Commonly known as:  ADVAIR Your last dose was: Your next dose is: Take 1 Puff by inhalation every twelve (12) hours. 1 Puff  
    
   
   
   
  
 insulin NPH/insulin regular 100 unit/mL (70-30) injection Commonly known as:  NOVOLIN 70/30, HUMULIN 70/30 Your last dose was: Your next dose is:    
   
   
 by SubCUTAneous route. LASIX 40 mg tablet Generic drug:  furosemide Your last dose was: Your next dose is: Take  by mouth daily. lisinopril 5 mg tablet Commonly known as:  Darryn Belinda Your last dose was: Your next dose is: Take  by mouth daily. metFORMIN 500 mg tablet Commonly known as:  GLUCOPHAGE Your last dose was: Your next dose is: Take 1 Tab by mouth daily (with breakfast). 500 mg  
    
   
   
   
  
 oxyCODONE-acetaminophen 5-325 mg per tablet Commonly known as:  PERCOCET Your last dose was: Your next dose is: Take 1 Tab by mouth every four (4) hours as needed for Pain. 1 Tab  
    
   
   
   
  
 SIMVASTATIN PO Your last dose was: Your next dose is: Take  by mouth. TRIBENZOR 40-5-12.5 mg Tab Generic drug:  Olmesartan-amLODIPine-HCTZ Your last dose was: Your next dose is: Take  by mouth. STOP taking these medications   
 cloNIDine HCl 0.1 mg tablet Commonly known as:  CATAPRES  
   
  
 predniSONE 5 mg dose pack Commonly known as:  STERAPRED Where to Get Your Medications Information on where to get these meds will be given to you by the nurse or doctor. ! Ask your nurse or doctor about these medications aspirin 325 mg tablet Opioid Education Prescription Opioids: What You Need to Know: 
 
Prescription opioids can be used to help relieve moderate-to-severe pain and are often prescribed following a surgery or injury, or for certain health conditions. These medications can be an important part of treatment but also come with serious risks. Opioids are strong pain medicines. Examples include hydrocodone, oxycodone, fentanyl, and morphine. Heroin is an example of an illegal opioid. It is important to work with your health care provider to make sure you are getting the safest, most effective care. WHAT ARE THE RISKS AND SIDE EFFECTS OF OPIOID USE? Prescription opioids carry serious risks of addiction and overdose, especially with prolonged use. An opioid overdose, often marked by slow breathing, can cause sudden death. The use of prescription opioids can have a number of side effects as well, even when taken as directed. · Tolerance-meaning you might need to take more of a medication for the same pain relief · Physical dependence-meaning you have symptoms of withdrawal when the medication is stopped. Withdrawal symptoms can include nausea, sweating, chills, diarrhea, stomach cramps, and muscle aches. Withdrawal can last up to several weeks, depending on which drug you took and how long you took it. · Increased sensitivity to pain · Constipation · Nausea, vomiting, and dry mouth · Sleepiness and dizziness · Confusion · Depression · Low levels of testosterone that can result in lower sex drive, energy, and strength · Itching and sweating RISKS ARE GREATER WITH:      
· History of drug misuse, substance use disorder, or overdose · Mental health conditions (such as depression or anxiety) · Sleep apnea · Older age (72 years or older) · Pregnancy Avoid alcohol while taking prescription opioids.   Also, unless specifically advised by your health care provider, medications to avoid include: · Benzodiazepines (such as Xanax or Valium) · Muscle relaxants (such as Soma or Flexeril) · Hypnotics (such as Ambien or Lunesta) · Other prescription opioids KNOW YOUR OPTIONS Talk to your health care provider about ways to manage your pain that don't involve prescription opioids. Some of these options may actually work better and have fewer risks and side effects. Options may include: 
· Pain relievers such as acetaminophen, ibuprofen, and naproxen · Some medications that are also used for depression or seizures · Physical therapy and exercise · Counseling to help patients learn how to cope better with triggers of pain and stress. · Application of heat or cold compress · Massage therapy · Relaxation techniques Be Informed Make sure you know the name of your medication, how much and how often to take it, and its potential risks & side effects. IF YOU ARE PRESCRIBED OPIOIDS FOR PAIN: 
· Never take opioids in greater amounts or more often than prescribed. Remember the goal is not to be pain-free but to manage your pain at a tolerable level. · Follow up with your primary care provider to: · Work together to create a plan on how to manage your pain. · Talk about ways to help manage your pain that don't involve prescription opioids. · Talk about any and all concerns and side effects. · Help prevent misuse and abuse. · Never sell or share prescription opioids · Help prevent misuse and abuse. · Store prescription opioids in a secure place and out of reach of others (this may include visitors, children, friends, and family). · Safely dispose of unused/unwanted prescription opioids: Find your community drug take-back program or your pharmacy mail-back program, or flush them down the toilet, following guidance from the Food and Drug Administration (www.fda.gov/Drugs/ResourcesForYou). · Visit www.cdc.gov/drugoverdose to learn about the risks of opioid abuse and overdose. · If you believe you may be struggling with addiction, tell your health care provider and ask for guidance or call Nolberto Montanez at 9-108-255-VBRC. Discharge Instructions Patient armband removed and shredded High Blood Pressure: Care Instructions Your Care Instructions If your blood pressure is usually above 140/90, you have high blood pressure, or hypertension. That means the top number is 140 or higher or the bottom number is 90 or higher, or both. Despite what a lot of people think, high blood pressure usually doesn't cause headaches or make you feel dizzy or lightheaded. It usually has no symptoms. But it does increase your risk for heart attack, stroke, and kidney or eye damage. The higher your blood pressure, the more your risk increases. Your doctor will give you a goal for your blood pressure. Your goal will be based on your health and your age. An example of a goal is to keep your blood pressure below 140/90. Lifestyle changes, such as eating healthy and being active, are always important to help lower blood pressure. You might also take medicine to reach your blood pressure goal. 
Follow-up care is a key part of your treatment and safety. Be sure to make and go to all appointments, and call your doctor if you are having problems. It's also a good idea to know your test results and keep a list of the medicines you take. How can you care for yourself at home? Medical treatment · If you stop taking your medicine, your blood pressure will go back up. You may take one or more types of medicine to lower your blood pressure. Be safe with medicines. Take your medicine exactly as prescribed. Call your doctor if you think you are having a problem with your medicine. · Talk to your doctor before you start taking aspirin every day. Aspirin can help certain people lower their risk of a heart attack or stroke. But taking aspirin isn't right for everyone, because it can cause serious bleeding. · See your doctor regularly. You may need to see the doctor more often at first or until your blood pressure comes down. · If you are taking blood pressure medicine, talk to your doctor before you take decongestants or anti-inflammatory medicine, such as ibuprofen. Some of these medicines can raise blood pressure. · Learn how to check your blood pressure at home. Lifestyle changes · Stay at a healthy weight. This is especially important if you put on weight around the waist. Losing even 10 pounds can help you lower your blood pressure. · If your doctor recommends it, get more exercise. Walking is a good choice. Bit by bit, increase the amount you walk every day. Try for at least 30 minutes on most days of the week. You also may want to swim, bike, or do other activities. · Avoid or limit alcohol. Talk to your doctor about whether you can drink any alcohol. · Try to limit how much sodium you eat to less than 2,300 milligrams (mg) a day. Your doctor may ask you to try to eat less than 1,500 mg a day. · Eat plenty of fruits (such as bananas and oranges), vegetables, legumes, whole grains, and low-fat dairy products. · Lower the amount of saturated fat in your diet. Saturated fat is found in animal products such as milk, cheese, and meat. Limiting these foods may help you lose weight and also lower your risk for heart disease. · Do not smoke. Smoking increases your risk for heart attack and stroke. If you need help quitting, talk to your doctor about stop-smoking programs and medicines. These can increase your chances of quitting for good. When should you call for help? Call 911 anytime you think you may need emergency care.  This may mean having symptoms that suggest that your blood pressure is causing a serious heart or blood vessel problem. Your blood pressure may be over 180/110. ? For example, call 911 if: 
? · You have symptoms of a heart attack. These may include: ¨ Chest pain or pressure, or a strange feeling in the chest. 
¨ Sweating. ¨ Shortness of breath. ¨ Nausea or vomiting. ¨ Pain, pressure, or a strange feeling in the back, neck, jaw, or upper belly or in one or both shoulders or arms. ¨ Lightheadedness or sudden weakness. ¨ A fast or irregular heartbeat. ? · You have symptoms of a stroke. These may include: 
¨ Sudden numbness, tingling, weakness, or loss of movement in your face, arm, or leg, especially on only one side of your body. ¨ Sudden vision changes. ¨ Sudden trouble speaking. ¨ Sudden confusion or trouble understanding simple statements. ¨ Sudden problems with walking or balance. ¨ A sudden, severe headache that is different from past headaches. ? · You have severe back or belly pain. ?Do not wait until your blood pressure comes down on its own. Get help right away. ?Call your doctor now or seek immediate care if: 
? · Your blood pressure is much higher than normal (such as 180/110 or higher), but you don't have symptoms. ? · You think high blood pressure is causing symptoms, such as: ¨ Severe headache. ¨ Blurry vision. ? Watch closely for changes in your health, and be sure to contact your doctor if: 
? · Your blood pressure measures 140/90 or higher at least 2 times. That means the top number is 140 or higher or the bottom number is 90 or higher, or both. ? · You think you may be having side effects from your blood pressure medicine. ? · Your blood pressure is usually normal, but it goes above normal at least 2 times. Where can you learn more? Go to http://rashawn-bre.info/. Enter T164 in the search box to learn more about \"High Blood Pressure: Care Instructions. \" 
 Current as of: September 21, 2016 Content Version: 11.4 © 0689-6499 [employer]. Care instructions adapted under license by [employer] (which disclaims liability or warranty for this information). If you have questions about a medical condition or this instruction, always ask your healthcare professional. [address] any warranty or liability for your use of this information. Learning About an Ischemic Stroke What is an ischemic stroke? An ischemic (say \"sqx-YFM-gjkv\") stroke occurs when a blood clot blocks a blood vessel in the brain. This means that blood cannot flow to some part of the brain. Without blood and the oxygen it carries, this part of the brain starts to die. The part of the body controlled by the damaged area of the brain cannot work properly. This is different from a hemorrhagic (say \"fcv-irx-IM-jick\") stroke, which happens when a blood vessel in the brain has burst open or has started to leak. The brain damage from a stroke starts within minutes. Quick treatment can help limit damage to the brain and make recovery more likely. People who have had a stroke may have a hard time talking, understanding things, and making decisions. They may have to relearn daily activities, such as how to eat, bathe, and dress. How well someone recovers from a stroke depends on how quickly the person gets to the hospital, where in the brain the stroke happened, and how severe it was. Training and therapy also make a difference in how well people recover. What are the symptoms? If you have any of these symptoms, call 911 or other emergency services right away: 
· You have symptoms of a stroke. These may include: 
¨ Sudden numbness, tingling, weakness, or loss of movement in your face, arm, or leg, especially on only one side of your body. ¨ Sudden vision changes. ¨ Sudden trouble speaking. ¨ Sudden confusion or trouble understanding simple statements. ¨ Sudden problems with walking or balance. ¨ A sudden, severe headache that is different from past headaches. See your doctor if you have symptoms that seem like a stroke, even if they go away quickly. You may have had a transient ischemic attack (TIA), sometimes called a mini-stroke. A TIA is a warning that a stroke may happen soon. Getting early treatment for a TIA can help prevent a stroke. What causes an ischemic stroke? An ischemic stroke is caused by a blood clot that blocks blood flow in the brain. The most common causes of blood clots include: 
· Hardening of the arteries (atherosclerosis). This is caused by high blood pressure, diabetes, high cholesterol, or smoking. · Atrial fibrillation. How is ischemic stroke treated? You may have to take several medicines, depending on what caused your stroke. Ask your doctor if a stroke rehab program is right for you. Rehab increases your chances of getting back some of the abilities you lost. 
How can you prevent another stroke? · Work with your doctor to treat any health problems you have. High blood pressure, high cholesterol, atrial fibrillation, and diabetes all raise your chances of having a stroke. · Be safe with medicines. Take your medicine exactly as prescribed. Call your doctor if you think you are having a problem with your medicine. · Have a healthy lifestyle. ¨ Do not smoke or allow others to smoke around you. If you need help quitting, talk to your doctor about stop-smoking programs and medicines. These can increase your chances of quitting for good. Smoking makes a stroke more likely. ¨ Limit alcohol to 2 drinks a day for men and 1 drink a day for women. ¨ Lose weight if you need to. A healthy weight will help you keep your heart and body healthy. ¨ Be active. Ask your doctor what type and level of activity is safe for you. ¨ Eat heart-healthy foods, like fruits, vegetables, and high-fiber foods. Follow-up care is a key part of your treatment and safety. Be sure to make and go to all appointments, and call your doctor if you are having problems. It's also a good idea to know your test results and keep a list of the medicines you take. Where can you learn more? Go to http://rashawn-bre.info/. Enter D161 in the search box to learn more about \"Learning About an Ischemic Stroke. \" Current as of: March 20, 2017 Content Version: 11.4 © 7068-8242 [employer]. Care instructions adapted under license by [employer] (which disclaims liability or warranty for this information). If you have questions about a medical condition or this instruction, always ask your healthcare professional. [address] any warranty or liability for your use of this information. Low Sodium Diet (2,000 Milligram): Care Instructions Your Care Instructions Too much sodium causes your body to hold on to extra water. This can raise your blood pressure and force your heart and kidneys to work harder. In very serious cases, this could cause you to be put in the hospital. It might even be life-threatening. By limiting sodium, you will feel better and lower your risk of serious problems. The most common source of sodium is salt. People get most of the salt in their diet from canned, prepared, and packaged foods. Fast food and restaurant meals also are very high in sodium. Your doctor will probably limit your sodium to less than 2,000 milligrams (mg) a day. This limit counts all the sodium in prepared and packaged foods and any salt you add to your food. Follow-up care is a key part of your treatment and safety. Be sure to make and go to all appointments, and call your doctor if you are having problems. It's also a good idea to know your test results and keep a list of the medicines you take. How can you care for yourself at home? Read food labels · Read labels on cans and food packages. The labels tell you how much sodium is in each serving. Make sure that you look at the serving size. If you eat more than the serving size, you have eaten more sodium. · Food labels also tell you the Percent Daily Value for sodium. Choose products with low Percent Daily Values for sodium. · Be aware that sodium can come in forms other than salt, including monosodium glutamate (MSG), sodium citrate, and sodium bicarbonate (baking soda). MSG is often added to Asian food. When you eat out, you can sometimes ask for food without MSG or added salt. Buy low-sodium foods · Buy foods that are labeled \"unsalted\" (no salt added), \"sodium-free\" (less than 5 mg of sodium per serving), or \"low-sodium\" (less than 140 mg of sodium per serving). Foods labeled \"reduced-sodium\" and \"light sodium\" may still have too much sodium. Be sure to read the label to see how much sodium you are getting. · Buy fresh vegetables, or frozen vegetables without added sauces. Buy low-sodium versions of canned vegetables, soups, and other canned goods. Prepare low-sodium meals · Cut back on the amount of salt you use in cooking. This will help you adjust to the taste. Do not add salt after cooking. One teaspoon of salt has about 2,300 mg of sodium. · Take the salt shaker off the table. · Flavor your food with garlic, lemon juice, onion, vinegar, herbs, and spices. Do not use soy sauce, lite soy sauce, steak sauce, onion salt, garlic salt, celery salt, mustard, or ketchup on your food. · Use low-sodium salad dressings, sauces, and ketchup. Or make your own salad dressings and sauces without adding salt. · Use less salt (or none) when recipes call for it. You can often use half the salt a recipe calls for without losing flavor. Other foods such as rice, pasta, and grains do not need added salt. · Rinse canned vegetables, and cook them in fresh water. This removes some-but not all-of the salt. · Avoid water that is naturally high in sodium or that has been treated with water softeners, which add sodium. Call your local water company to find out the sodium content of your water supply. If you buy bottled water, read the label and choose a sodium-free brand. Avoid high-sodium foods · Avoid eating: ¨ Smoked, cured, salted, and canned meat, fish, and poultry. ¨ Ham, ramirez, hot dogs, and luncheon meats. ¨ Regular, hard, and processed cheese and regular peanut butter. ¨ Crackers with salted tops, and other salted snack foods such as pretzels, chips, and salted popcorn. ¨ Frozen prepared meals, unless labeled low-sodium. ¨ Canned and dried soups, broths, and bouillon, unless labeled sodium-free or low-sodium. ¨ Canned vegetables, unless labeled sodium-free or low-sodium. ¨ Western Me[employer] fries, pizza, tacos, and other fast foods. ¨ Pickles, olives, ketchup, and other condiments, especially soy sauce, unless labeled sodium-free or low-sodium. Where can you learn more? Go to http://rashawn-bre.info/. Enter C466 in the search box to learn more about \"Low Sodium Diet (2,000 Milligram): Care Instructions. \" Current as of: May 12, 2017 Content Version: 11.4 © 4618-3964 [email or web address]. Care instructions adapted under license by [employer] (which disclaims liability or warranty for this information). If you have questions about a medical condition or this instruction, always ask your healthcare professional. [address] [address] any warranty or liability for your use of this information. [email or web address] Activation Thank you for requesting access to [employer]. Please follow the instructions below to securely access and download your online medical record. [email or web address] allows you to send messages to your doctor, view your test results, renew your prescriptions, schedule appointments, and more. How Do I Sign Up? 1. In your internet browser, go to [email or web address]."[email or web address]. Med[employer]ind 
2. Click on the First Time User? Click Here link in the Sign In box. You will be redirect to the New Member Sign Up page. 3. Enter your [employer] Access Code exactly as it appears below. You will not need to use this code after youve completed the sign-up process. If you do not sign up before the expiration date, you must request a new code. [employer] Access Code: [email or web address] Expires: 2018  8:40 AM (This is the date your [employer] access code will ) 4. Enter the last four digits of your Social Security Number (xxxx) and Date of Birth (mm/dd/yyyy) as indicated and click Submit. You will be taken to the next sign-up page. 5. Create a [employer] ID. This will be your [employer] login ID and cannot be changed, so think of one that is secure and easy to remember. 6. Create a [employer] password. You can change your password at any time. 7. Enter your Password Reset Question and Answer. This can be used at a later time if you forget your password. 8. Enter your e-mail address. You will receive e-mail notification when new information is available in [address] 9. Click Sign Up. You can now view and download portions of your medical record. 10. Click the Download Summary menu link to download a portable copy of your medical information. Additional Information If you have questions, please visit the Frequently Asked Questions section of the [employer] website at [email or web address] Remember, [employer] is NOT to be used for urgent needs. For medical emergencies, dial 911. DISCHARGE SUMMARY from Nurse PATIENT INSTRUCTIONS: 
 
 
F-face looks uneven A-arms unable to move or move unevenly S-speech slurred or non-existent T-time-call 911 as soon as signs and symptoms begin-DO NOT go Back to bed or wait to see if you get better-TIME IS BRAIN. Warning Signs of HEART ATTACK Call 911 if you have these symptoms: 
? Chest discomfort. Most heart attacks involve discomfort in the center of the chest that lasts more than a few minutes, or that goes away and comes back. It can feel like uncomfortable pressure, squeezing, fullness, or pain. ? Discomfort in other areas of the upper body. Symptoms can include pain or discomfort in one or both arms, the back, neck, jaw, or stomach. ? Shortness of breath with or without chest discomfort. ? Other signs may include breaking out in a cold sweat, nausea, or lightheadedness. Don't wait more than five minutes to call [address]! Fast action can save your life. Calling 911 is almost always the fastest way to get lifesaving treatment. Emergency Medical Services staff can begin treatment when they arrive  up to an hour sooner than if someone gets to the hospital by car. The discharge information has been reviewed with the patient. The patient verbalized understanding. Discharge medications reviewed with the patient and appropriate educational materials and side effects teaching were provided. ___________________________________________________________________________________________________________________________________ [employer] Announcement We are excited to announce that we are making your provider's discharge notes available to you in mojiot. You will see these notes when they are completed and signed by the physician that discharged you from your recent hospital stay.   If you have any questions or concerns about any information you see in mojiot, please call the Health Information Department where you were seen or reach out to your Primary Care Provider for more information about your plan of care. Introducing [facility]! [facility] introduces [employer] patient portal. Now you can access parts of your medical record, email your doctor's office, and request medication refills online. 1. In your internet browser, go to [email or web address] 2. Click on the First Time User? Click Here link in the Sign In box. You will see the New Member Sign Up page. 3. Enter your [employer] Access Code exactly as it appears below. You will not need to use this code after youve completed the sign-up process. If you do not sign up before the expiration date, you must request a new code. · [employer] Access Code: [email or web address] Expires: 7/16/2018  8:40 AM 
 
4. Enter the last four digits of your Social Security Number (xxxx) and Date of Birth (mm/dd/yyyy) as indicated and click Submit. You will be taken to the next sign-up page. 5. Create a [employer] ID. This will be your [employer] login ID and cannot be changed, so think of one that is secure and easy to remember. 6. Create a [employer] password. You can change your password at any time. 7. Enter your Password Reset Question and Answer. This can be used at a later time if you forget your password. 8. Enter your e-mail address. You will receive e-mail notification when new information is available in [address] 9. Click Sign Up. You can now view and download portions of your medical record. 10. Click the Download Summary menu link to download a portable copy of your medical information. If you have questions, please visit the Frequently Asked Questions section of the [employer] website. Remember, [employer] [facility] is NOT to be used for urgent needs. For medical emergencies, dial 911. Now available from your iPhone and Android! Introducing Mike Burt As a [facility] patient, I wanted to make you aware of our electronic visit tool called Mike Burt. [employer] Company allows you to connect within minutes with a medical provider 24 hours a day, seven days a week via a mobile device or tablet or logging into a secure website from your computer. You can access [employer] from anywhere in the [place]. A virtual visit might be right for you when you have a simple condition and feel like you just dont want to get out of bed, or cant get away from work for an appointment, when your regular [employer] provider is not available (evenings, weekends or holidays), or when youre out of town and need minor care. Electronic visits cost only $49 and if the [employer] provider determines a prescription is needed to treat your condition, one can be electronically transmitted to a nearby pharmacy*. Please take a moment to enroll today if you have not already done so. The enrollment process is free and takes just a few minutes. To enroll, please download the [employer] mary to your tablet or phone, or visit [email or web address] to enroll on your computer. And, as an [address] [place] [address] patient with a [employer] account, the results of your visits will be scanned into your electronic medical record and your primary care provider will be able to view the scanned results. We urge you to continue to see your regular [employer] provider for your ongoing medical care. And while your primary care provider may not be the one available when you seek a [employer] virtual visit, the peace of mind you get from getting a real diagnosis real time can be priceless. For more information on [employer]ifin, view our Frequently Asked Questions (FAQs) at [email or web address]. Sincerely, 
 
Allegra Mcadams MD 
Chief Medical Officer Sol Haji *:  certain medications cannot be prescribed via [employer] Providers Seen During Your Hospitalization Provider Specialty Primary office phone Tracie Aparicio MD Emergency Medicine [phone] Edward Go MD Internal Medicine [phone] Your Primary Care Physician (PCP) Primary Care Physician Office Phone Office Fax UNKNOWN, PROVIDER ** None ** ** None ** You are allergic to the following Allergen Reactions Pcn (Penicillins) Hives Swelling Penicillins Hives Recent Documentation Height Weight BMI OB Status Smoking Status 1.575 m 91.3 kg 36.8 kg/m2 Having regular periods Current Some Day Smoker Emergency Contacts Name Discharge Info Relation Home Work Mobile Joel Denis CAREGIVER [3] Child [2] 21  Leonard Ruvalcaba DISCHARGE CAREGIVER [3] Boyfriend [17] [phone] Lilly Madrid DISCHARGE CAREGIVER [3] Child [2] [phone] Patient Belongings The following personal items are in your possession at time of discharge: 
  Dental Appliances: None  Visual Aid: None      Home Medications: None   Jewelry: None  Clothing: At bedside    Other Valuables: None Please provide this summary of care documentation to your next provider. Signatures-by signing, you are acknowledging that this After Visit Summary has been reviewed with you and you have received a copy. Patient Signature:  ____________________________________________________________ Date:  ____________________________________________________________  
  
[facility] Provider Signature:  ____________________________________________________________ Date:  ____________________________________________________________

## 2018-04-17 NOTE — ED PROVIDER NOTES
EMERGENCY DEPARTMENT HISTORY AND PHYSICAL EXAM    8:40 AM      Date: 4/17/2018  Patient Name: Debbie Wood    History of Presenting Illness     Chief Complaint   Patient presents with    Hand Pain    Arm Pain     History Provided By: Patient    Chief Complaint: Right sided pain/weakness  Duration: ~7 Hours  Timing:  Acute  Location: Right side  Quality: Weak  Severity: N/A  Modifying Factors: No relieving or worsening factors   Associated Symptoms: Speech change      Additional History (Context): Debbie Wood is a 45 y.o. female with PMHX of HLD, thromboembolus, arthritis, migraines, CHF, HTN, asthma, and anemia who presents via EMS with acute RUE weakness and pain, onset ~7 hours ago. Associated sxs include change in speech. Pt states she woke up ~3am to go to the bathroom and \"couldn't move her right side. \" Pt notes cough for ~1 week now. Pt states she had a normal night last night with a slight HA. No modifying or aggravating factors were reported. Denies dysuria. No other symptoms or concerns were expressed.      PCP: PROVIDER UNKNOWN        Past History     Past Medical History:  Past Medical History:   Diagnosis Date    Anemia     Anemia NEC     Arthritis     Asthma     Chest pain, unspecified     Possible GERD, normal NUC Stress test    Chronic diastolic heart failure (HCC)     Normal EF, DD    Essential hypertension, benign     Better controlled    Hypercholesteremia     Hypertension     Migraines     Other and unspecified hyperlipidemia     Shortness of breath     Possible CHF, asthma, COPD    Thromboembolus ([address]        Past Surgical History:  Past Surgical History:   Procedure Laterality Date    HX GYN      4 c-sections       Family History:  Family History   Problem Relation Age of Onset    Heart Disease Other      positive family history for heart disease    Diabetes Mother     Cancer Mother     Cancer Father     Diabetes Sister     Diabetes Paternal Grandmother        Social History:  Social History   Substance Use Topics    Smoking status: Current Some Day Smoker    Smokeless tobacco: Not on file    Alcohol use No       Allergies: Allergies   Allergen Reactions    Pcn [Penicillins] Hives and Swelling    Penicillins Hives         Review of Systems     Review of Systems   Constitutional: Negative for chills and fever. Respiratory: Positive for cough. Negative for shortness of breath. Cardiovascular: Negative for chest pain. Gastrointestinal: Negative for diarrhea, nausea and vomiting. Genitourinary: Negative for dysuria. Musculoskeletal: Positive for myalgias (RUE). Neurological: Positive for speech difficulty and weakness (right side). All other systems reviewed and are negative. Physical Exam     Visit Vitals    /84 (BP 1 Location: Left arm, BP Patient Position: At rest)    Pulse 92    Temp 97.7 °F (36.5 °C)  Comment: pt arrived on unit    Resp 16    Ht 5' 2\" (1.575 m)    Wt 91.2 kg (201 lb)    SpO2 92%    BMI 36.76 kg/m2     Physical Exam   Constitutional: She is oriented to person, place, and time. She appears well-developed and well-nourished. No distress. HENT:   Head: Normocephalic and atraumatic. Eyes: Conjunctivae and EOM are normal. Right eye exhibits no discharge. Left eye exhibits no discharge. No scleral icterus. Neck: Normal range of motion. Neck supple. No tracheal deviation present. Cardiovascular: Normal rate, regular rhythm and normal heart sounds. No murmur heard. Pulmonary/Chest: Effort normal and breath sounds normal. No respiratory distress. She has no wheezes. She has no rales. Abdominal: Soft. She exhibits no distension. There is no tenderness. There is no rebound and no guarding. Musculoskeletal: Normal range of motion. She exhibits no edema or deformity. Contracted right hand. Neurological: She is alert and oriented to person, place, and time. Dysarthric speech. RLE weakness.    Skin: Skin is warm and dry. She is not diaphoretic. Psychiatric: She has a normal mood and affect. Her behavior is normal. Judgment and thought content normal.   Nursing note and vitals reviewed. Diagnostic Study Results     Labs -  Recent Results (from the past 12 hour(s))   GLUCOSE, POC    Collection Time: 04/17/18  9:19 AM   Result Value Ref Range    Glucose (POC) 183 (H) 70 - 110 mg/dL   CBC WITH AUTOMATED DIFF    Collection Time: 04/17/18  9:24 AM   Result Value Ref Range    WBC 5.8 4.6 - 13.2 K/uL    RBC 4.75 4.20 - 5.30 M/uL    HGB 15.0 12.0 - 16.0 g/dL    HCT 41.8 35.0 - 45.0 %    MCV 88.0 74.0 - 97.0 FL    MCH 31.6 24.0 - 34.0 PG    MCHC 35.9 31.0 - 37.0 g/dL    RDW 14.5 11.6 - 14.5 %    PLATELET 994 247 - 248 K/uL    MPV 10.0 9.2 - 11.8 FL    NEUTROPHILS 56 40 - 73 %    LYMPHOCYTES 39 21 - 52 %    MONOCYTES 4 3 - 10 %    EOSINOPHILS 1 0 - 5 %    BASOPHILS 0 0 - 2 %    ABS. NEUTROPHILS 3.3 1.8 - 8.0 K/UL    ABS. LYMPHOCYTES 2.3 0.9 - 3.6 K/UL    ABS. MONOCYTES 0.2 0.05 - 1.2 K/UL    ABS. EOSINOPHILS 0.0 0.0 - 0.4 K/UL    ABS.  BASOPHILS 0.0 0.0 - 0.06 K/UL    DF AUTOMATED     METABOLIC PANEL, BASIC    Collection Time: 04/17/18  9:24 AM   Result Value Ref Range    Sodium 141 136 - 145 mmol/L    Potassium 4.1 3.5 - 5.5 mmol/L    Chloride 105 100 - 108 mmol/L    CO2 29 21 - 32 mmol/L    Anion gap 7 3.0 - 18 mmol/L    Glucose 182 (H) 74 - 99 mg/dL    BUN 14 7.0 - 18 MG/DL    Creatinine 0.82 0.6 - 1.3 MG/DL    BUN/Creatinine ratio 17 12 - 20      GFR est AA >60 >60 ml/min/1.73m2    GFR est non-AA >60 >60 ml/min/1.73m2    Calcium 9.6 8.5 - 10.1 MG/DL   PROTHROMBIN TIME + INR    Collection Time: 04/17/18  9:24 AM   Result Value Ref Range    Prothrombin time 12.1 11.5 - 15.2 sec    INR 0.9 0.8 - 1.2     EKG, 12 LEAD, INITIAL    Collection Time: 04/17/18 11:30 AM   Result Value Ref Range    Ventricular Rate 88 BPM    Atrial Rate 88 BPM    P-R Interval 160 ms    QRS Duration 82 ms    Q-T Interval 398 ms    QTC Calculation (Bezet) 481 ms    Calculated P Axis 58 degrees    Calculated R Axis 4 degrees    Calculated T Axis 79 degrees    Diagnosis       Normal sinus rhythm  Nonspecific T wave abnormality  Prolonged QT  Abnormal ECG    Confirmed by Subhash Hoyt MD, Any Pugh ([id number]) on 4/17/2018 1:11:25 PM         Radiologic Studies -   CT HEAD WO CONT   Final Result   IMPRESSION:     No evidence of intracranial hemorrhage or any other definable acute intracranial  abnormality.     There is no definable focal abnormality in brain. As read by the radiologist.          Medical Decision Making   I am the first provider for this patient. I reviewed the vital signs, available nursing notes, past medical history, past surgical history, family history and social history. Vital Signs-Reviewed the patient's vital signs. Pulse Oximetry Analysis -  95% on room air    Cardiac Monitor:   Rate: 88 bpm  Rhythm:  Normal Sinus Rhythm     EKG: Interpreted by the EP. Time Interpreted: 10:40 AM   Rate: 88 bpm   Rhythm: Normal Sinus Rhythm    Interpretation: T wave inversions in leads I and aVL. No STEMI. Records Reviewed: Nursing Notes (Time of Review: 8:40 AM)    ED Course: Progress Notes, Reevaluation, and Consults:  8:52 AM Code S called. 9:06 AM Discussed patient's history, exam, and available diagnostics results with Chencho Myrick MD, neurology, who agrees to evaluate pt.     9:34 AM Discussed patient's history, exam, and available diagnostics results with Chencho Myrick MD, neurology, who believes she had a stroke and recommends giving aspirin and admit. 10:33 AM Discussed patient's history, exam, and available diagnostics results with Vonda Raymond MD, hospitalist, who accepts admission. 10:31 Pt feeling better. Discussed results and plan to admit. Agrees with plan. Provider Notes (Medical Decision Making): Pt with stroke sxs outside of tPA window. CT negative for bleed. Treated with aspirin and admitted. Sx improving. For Hospitalized Patients:    1. Hospitalization Decision Time:  The decision to hospitalize the patient was made by Dr. David Hassan at 10:32 AM on 4/17/2018    2. Aspirin: Aspirin was given    Diagnosis     Clinical Impression:   1. Cerebrovascular accident (CVA), unspecified mechanism ([address]        Disposition: admit    Current Discharge Medication List      CONTINUE these medications which have NOT CHANGED    Details   CYCLOBENZAPRINE HCL (CYCLOBENZAPRINE PO) Take  by mouth. SIMVASTATIN PO Take  by mouth. fluticasone-salmeterol (ADVAIR) 100-50 mcg/dose diskus inhaler Take 1 Puff by inhalation every twelve (12) hours. insulin NPH/insulin regular (NOVOLIN 70/30, HUMULIN 70/30) 100 unit/mL (70-30) injection by SubCUTAneous route. metFORMIN (GLUCOPHAGE) 500 mg tablet Take 1 Tab by mouth daily (with breakfast). Qty: 30 Tab, Refills: 0      oxyCODONE-acetaminophen (PERCOCET) 5-325 mg per tablet Take 1 Tab by mouth every four (4) hours as needed for Pain. Qty: 8 Tab, Refills: 0      Olmesartan-Amlodipine-HCTZ (TRIBENZOR) 40-5-12.5 mg Tab Take  by mouth.      predniSONE (STERAPRED) 5 mg dose pack See administration instruction per 5mg dose pack  Qty: 21 Tab, Refills: 0      lisinopril (PRINIVIL, ZESTRIL) 5 mg tablet Take  by mouth daily. furosemide (LASIX) 40 mg tablet Take  by mouth daily. ferrous sulf-fa-b comp with c (MULTIRET FOLIC) 067 (089)-5.5 mg TbER Take 1 Tab by mouth daily. Qty: 90 Tab, Refills: 0      albuterol (PROVENTIL VENTOLIN) 2.5 mg /3 mL (0.083 %) nebulizer solution 5 mg by Nebulization route once.         cloNIDine (CATAPRES) 0.1 mg tablet Take 0.1 mg by mouth two (2) times a day.             _______________________________    Attestations:  Scribe [address] [place] [address] acting as a scribe for and in the presence of Tracie Aparicio MD      April 17, 2018 at 1:37 PM       Provider Attestation:      I personally performed the services described in the documentation, reviewed the documentation, as recorded by the scribe in my presence, and it accurately and completely records my words and actions.  April 17, 2018 at 1:37 PM - Donovan Mattson MD    _______________________________

## 2018-04-17 NOTE — ROUTINE PROCESS
TRANSFER - IN REPORT:    Verbal report received from VikkiRN(name) on Lizzie Soriano  being received from ED(unit) for routine progression of care      Report consisted of patients Situation, Background, Assessment and   Recommendations(SBAR). Information from the following report(s) SBAR, Kardex and ED Summary was reviewed with the receiving nurse. Opportunity for questions and clarification was provided. Assessment completed upon patients arrival to unit at 1200 and care assumed.

## 2018-04-17 NOTE — ED NOTES
TRANSFER - OUT REPORT:    Verbal report given to  Ramirez (name) on Génesis Net  being transferred to [address] [place] [address] (unit) for routine progression of care       Report consisted of patients Situation, Background, Assessment and   Recommendations(SBAR). Information from the following report(s) SBAR, ED Summary, Intake/Output and MAR was reviewed with the receiving nurse. Lines:   Peripheral IV 04/17/18 Right Antecubital (Active)   Site Assessment Clean, dry, & intact 4/17/2018  8:46 AM   Phlebitis Assessment 0 4/17/2018  8:46 AM   Infiltration Assessment 0 4/17/2018  8:46 AM   Dressing Status Clean, dry, & intact 4/17/2018  8:46 AM   Dressing Type 4 X 4 4/17/2018  8:46 AM        Opportunity for questions and clarification was provided.       Patient transported with:   Registered Nurse

## 2018-04-17 NOTE — PROGRESS NOTES
ARU/IPR REFERRAL CONTACT NOTE  [address] [place] [address] Physical Rehabilitation    RE: Eleanor Hernandez    Referral received to review this patient's case for admission to [address] [place] [address] Physical Rehabilitation. Current status reviewed.  When appropriate, will need PT/OT/ST evaluation/treatment on this patient to complete the pre-admission evaluation.  Will continue to follow. Thank you for this referral.  Should you have any questions please do not hesitate to call. Sincerely,  Medhat Gonzalez.  [address] [place] [address] Physical [facility]  [phone]

## 2018-04-18 ENCOUNTER — APPOINTMENT (OUTPATIENT)
Dept: CT IMAGING | Age: 53
DRG: 065 | End: 2018-04-18
Attending: INTERNAL MEDICINE
Payer: SELF-PAY

## 2018-04-18 LAB
AMPHET UR QL SCN: NEGATIVE
BARBITURATES UR QL SCN: NEGATIVE
BENZODIAZ UR QL: POSITIVE
CANNABINOIDS UR QL SCN: NEGATIVE
CHOLEST SERPL-MCNC: 284 MG/DL
COCAINE UR QL SCN: NEGATIVE
ERYTHROCYTE [DISTWIDTH] IN BLOOD BY AUTOMATED COUNT: 14.3 % (ref 11.6–14.5)
EST. AVERAGE GLUCOSE BLD GHB EST-MCNC: 192 MG/DL
GLUCOSE BLD STRIP.AUTO-MCNC: 144 MG/DL (ref 70–110)
GLUCOSE BLD STRIP.AUTO-MCNC: 162 MG/DL (ref 70–110)
GLUCOSE BLD STRIP.AUTO-MCNC: 165 MG/DL (ref 70–110)
GLUCOSE BLD STRIP.AUTO-MCNC: 279 MG/DL (ref 70–110)
HBA1C MFR BLD: 8.3 % (ref 4.2–5.6)
HCT VFR BLD AUTO: 41.2 % (ref 35–45)
HDLC SERPL-MCNC: 52 MG/DL (ref 40–60)
HDLC SERPL: 5.5 {RATIO} (ref 0–5)
HDSCOM,HDSCOM: ABNORMAL
HGB BLD-MCNC: 14.2 G/DL (ref 12–16)
LDLC SERPL CALC-MCNC: 182.2 MG/DL (ref 0–100)
LIPID PROFILE,FLP: ABNORMAL
MCH RBC QN AUTO: 30.9 PG (ref 24–34)
MCHC RBC AUTO-ENTMCNC: 34.5 G/DL (ref 31–37)
MCV RBC AUTO: 89.6 FL (ref 74–97)
METHADONE UR QL: NEGATIVE
OPIATES UR QL: NEGATIVE
PCP UR QL: NEGATIVE
PLATELET # BLD AUTO: 208 K/UL (ref 135–420)
PMV BLD AUTO: 10.7 FL (ref 9.2–11.8)
RBC # BLD AUTO: 4.6 M/UL (ref 4.2–5.3)
TRIGL SERPL-MCNC: 249 MG/DL (ref ?–150)
VLDLC SERPL CALC-MCNC: 49.8 MG/DL
WBC # BLD AUTO: 4.1 K/UL (ref 4.6–13.2)

## 2018-04-18 PROCEDURE — 65660000000 HC RM CCU STEPDOWN

## 2018-04-18 PROCEDURE — 74011636320 HC RX REV CODE- 636/320: Performed by: INTERNAL MEDICINE

## 2018-04-18 PROCEDURE — 80307 DRUG TEST PRSMV CHEM ANLYZR: CPT | Performed by: INTERNAL MEDICINE

## 2018-04-18 PROCEDURE — 74011250636 HC RX REV CODE- 250/636: Performed by: INTERNAL MEDICINE

## 2018-04-18 PROCEDURE — 74011250637 HC RX REV CODE- 250/637: Performed by: INTERNAL MEDICINE

## 2018-04-18 PROCEDURE — 74011000250 HC RX REV CODE- 250: Performed by: INTERNAL MEDICINE

## 2018-04-18 PROCEDURE — 80061 LIPID PANEL: CPT | Performed by: INTERNAL MEDICINE

## 2018-04-18 PROCEDURE — 82962 GLUCOSE BLOOD TEST: CPT

## 2018-04-18 PROCEDURE — 85027 COMPLETE CBC AUTOMATED: CPT | Performed by: INTERNAL MEDICINE

## 2018-04-18 PROCEDURE — 74011636637 HC RX REV CODE- 636/637: Performed by: INTERNAL MEDICINE

## 2018-04-18 PROCEDURE — 36415 COLL VENOUS BLD VENIPUNCTURE: CPT | Performed by: INTERNAL MEDICINE

## 2018-04-18 PROCEDURE — 93306 TTE W/DOPPLER COMPLETE: CPT

## 2018-04-18 PROCEDURE — 92610 EVALUATE SWALLOWING FUNCTION: CPT

## 2018-04-18 PROCEDURE — 70496 CT ANGIOGRAPHY HEAD: CPT

## 2018-04-18 PROCEDURE — 83036 HEMOGLOBIN GLYCOSYLATED A1C: CPT | Performed by: INTERNAL MEDICINE

## 2018-04-18 RX ORDER — SODIUM CHLORIDE 9 MG/ML
10 INJECTION INTRAMUSCULAR; INTRAVENOUS; SUBCUTANEOUS
Status: COMPLETED | OUTPATIENT
Start: 2018-04-18 | End: 2018-04-18

## 2018-04-18 RX ADMIN — INSULIN LISPRO 2 UNITS: 100 INJECTION, SOLUTION INTRAVENOUS; SUBCUTANEOUS at 08:06

## 2018-04-18 RX ADMIN — INSULIN LISPRO 6 UNITS: 100 INJECTION, SOLUTION INTRAVENOUS; SUBCUTANEOUS at 16:19

## 2018-04-18 RX ADMIN — ATORVASTATIN CALCIUM 80 MG: 40 TABLET, FILM COATED ORAL at 21:43

## 2018-04-18 RX ADMIN — HEPARIN SODIUM 5000 UNITS: 5000 INJECTION, SOLUTION INTRAVENOUS; SUBCUTANEOUS at 08:02

## 2018-04-18 RX ADMIN — INSULIN LISPRO 2 UNITS: 100 INJECTION, SOLUTION INTRAVENOUS; SUBCUTANEOUS at 21:46

## 2018-04-18 RX ADMIN — DOCUSATE SODIUM 100 MG: 100 CAPSULE, LIQUID FILLED ORAL at 08:03

## 2018-04-18 RX ADMIN — HEPARIN SODIUM 5000 UNITS: 5000 INJECTION, SOLUTION INTRAVENOUS; SUBCUTANEOUS at 16:24

## 2018-04-18 RX ADMIN — HEPARIN SODIUM 5000 UNITS: 5000 INJECTION, SOLUTION INTRAVENOUS; SUBCUTANEOUS at 01:33

## 2018-04-18 RX ADMIN — ACETAMINOPHEN 650 MG: 325 TABLET ORAL at 08:02

## 2018-04-18 RX ADMIN — IOPAMIDOL 50 ML: 755 INJECTION, SOLUTION INTRAVENOUS at 18:00

## 2018-04-18 RX ADMIN — Medication 10 ML: at 21:48

## 2018-04-18 RX ADMIN — Medication 10 ML: at 11:49

## 2018-04-18 RX ADMIN — DOCUSATE SODIUM 100 MG: 100 CAPSULE, LIQUID FILLED ORAL at 17:38

## 2018-04-18 RX ADMIN — SODIUM CHLORIDE 10 ML: 9 INJECTION INTRAMUSCULAR; INTRAVENOUS; SUBCUTANEOUS at 11:28

## 2018-04-18 RX ADMIN — Medication 10 ML: at 16:25

## 2018-04-18 RX ADMIN — ASPIRIN 325 MG ORAL TABLET 325 MG: 325 PILL ORAL at 08:05

## 2018-04-18 RX ADMIN — FAMOTIDINE 20 MG: 20 TABLET, FILM COATED ORAL at 21:43

## 2018-04-18 RX ADMIN — FAMOTIDINE 20 MG: 20 TABLET, FILM COATED ORAL at 08:02

## 2018-04-18 NOTE — DIABETES MGMT
NUTRITIONAL ASSESSMENT GLYCEMIC CONTROL/ PLAN OF CARE     Rosalba Durand           45 y.o.           4/17/2018                 1. Cerebrovascular accident (CVA), unspecified mechanism ([address]       INTERVENTIONS/PLAN:   Monitor need for Lantus insulin if glucose trends up  Diabetes education   Continue inpatient monitoring and intervention  ASSESSMENT:   Pt is a 45year old female with a past medical history significant for hypertension and diabetes admitted with acute CVA. Pt is receiving correctional Lispro insulin coverage 4 times daily ACHS. Pt is tolerating diet with good intake at lunch meal. Pt reports a good appetite and states she has stopped eating fried foods. Discussed healthy eating for diabetes, portion control, carbohydrates, and limiting sodium. Pt reports using insulin pen at home without difficulty. Pt has glucometer.      Diabetes Management:   Recent blood glucose:    4/17/2018 15:35 4/17/2018 20:57 4/18/2018 07:44 4/18/2018 11:47   132 (H) 143 (H) 162 (H) 144 (H)    Within target range (non-ICU: <140; ICU<180): [x] Yes   []  No    Current Insulin regimen:   Correctional Lispro insulin 4 times daily ACHS (normal insulin sensitivity)  Home medication/insulin regimen:   Novolin 70/30 insulin 22 units before breakfast and dinner   Metformin   HbA1c: 8.3% (estimated average glucose of 192 mg/dL)  Adequate glycemic control PTA:  [] Yes  [x] No     SUBJECTIVE/OBJECTIVE:   Information obtained from: patient, chart review     Diet: Diabetic, consistent carbohydrate, 1.5 gram NA    Patient Vitals for the past 100 hrs:   % Diet Eaten   04/18/18 1213 75 %   04/18/18 0824 100 %   04/18/18 0745 0 %   04/18/18 0400 0 %   04/17/18 2000 0 %   04/17/18 1200 0 %     Medications: [x]  Reviewed     Most Recent POC Glucose:   Recent Labs      04/17/18   0924   GLU  182*      Labs:   Lab Results   Component Value Date/Time    Hemoglobin A1c 8.3 (H) 04/18/2018 03:30 AM     Lab Results   Component Value Date/Time Sodium 141 04/17/2018 09:24 AM    Potassium 4.1 04/17/2018 09:24 AM    Chloride 105 04/17/2018 09:24 AM    CO2 29 04/17/2018 09:24 AM    Anion gap 7 04/17/2018 09:24 AM    Glucose 182 (H) 04/17/2018 09:24 AM    BUN 14 04/17/2018 09:24 AM    Creatinine 0.82 04/17/2018 09:24 AM    Calcium 9.6 04/17/2018 09:24 AM    Phosphorus 3.4 08/31/2012 04:00 AM    Albumin 3.4 11/19/2014 09:40 AM     Anthropometrics:   BMI (calculated): 36.8  Wt Readings from Last 1 Encounters:   04/18/18 91.3 kg (201 lb 3.2 oz)      Ht Readings from Last 1 Encounters:   04/17/18 5' 2\" (1.575 m)     Estimated Nutrition Needs:  3550-4615 Kcal/day (500 Kcal deficit for weight loss), 73-91 grams protein/day   Based on:   [x] Actual BW    [] IBW   [] Adjusted BW      Nutrition Diagnoses:    Altered nutrition related lab value related to diabetes as evidenced by Hemoglobin A1c of 8.3%  Nutrition Interventions: diabetes education, coordination of care   Goal: Blood glucose will be within target range of  mg/dL by 4/21/18    Nutrition Monitoring and Evaluation    []     Monitor po intake on meal rounds  [x]     Continue inpatient monitoring and intervention  []     Other:    Jonathan Velasquez RD, CDE  pgr [phone]

## 2018-04-18 NOTE — PROGRESS NOTES
Care Management Interventions  Physical Therapy Consult: Yes  Occupational Therapy Consult: Yes  Speech Therapy Consult: Yes  Current Support Network: Family Lives Nearby, Lives Alone, Own Home  Confirm Follow Up Transport: Family  Plan discussed with Pt/Family/Caregiver: Yes    45 yr old female admit with CVA. Patient states that she lives at home alone and has good family support that live nearby and she was able to verify home address. Patient states that she is independent with all her ADL's, has a cane and nebulizer at home and denies home /[facility]. Patient verbalized no needs at this time, will continue to follow patient and recommendations from therapies for discharge disposition.

## 2018-04-18 NOTE — ROUTINE PROCESS
Returned to floor. Stroke Education provided to patient and the following topics were discussed    1. Patients personal risk factors for stroke are hypertension and diabetes mellitus    2. Warning signs of Stroke:        * Sudden numbness or weakness of the face, arm or leg, especially on one side of          The body            * Sudden confusion, trouble speaking or understanding        * Sudden trouble seeing in one or both eyes        * Sudden trouble walking, dizziness, loss of balance or coordination        * Sudden severe headache with no known cause      3. Importance of activation Emergency Medical Services ( 9-1-1 ) immediately if experience any warning signs of stroke. 4. Be sure and schedule a follow-up appointment with your primary care doctor or any specialists as instructed. 5. You must take medicine every day to treat your risk factors for stroke. Be sure to take your medicines exactly as your doctor tells you: no more, no less. Know what your medicines are for , what they do. Anti-thrombotics /anticoagulants can help prevent strokes. You are taking the following medicine(s)  heparin     6. Smoking and second-hand smoke greatly increase your risk of stroke, cardiovascular disease and death. Smoking history cigarettes, a few per day    7. Information provided was [facility] Stroke Education Binder    8. Documentation of teaching completed in Patient Education Activity and on Care Plan with teaching response noted?   yes

## 2018-04-18 NOTE — ROUTINE PROCESS
Bedside and Verbal shift change report given to Latanya Patel (oncoming nurse) by Nereida Castillo (offgoing nurse). Report included the following information SBAR and Kardex.

## 2018-04-18 NOTE — PROGRESS NOTES
Problem: Dysphagia (Adult)  Goal: *Acute Goals and Plan of Care (Insert Text)  Patient will:  1. Tolerate PO trials with 0 s/s overt distress in 4/5 trials-met    Recommend:   Regular diet with thin liquids  Meds as tolerated  Outcome: Resolved/Met Date Met: 04/18/18  Speech LAnguage Pathology bedside swallow evaluation AND DISCHARGE    Patient: Marily León (45 y.o. female)  Date: 4/18/2018  Primary Diagnosis: CVA (cerebral vascular accident) [facility])  Precautions: None on file       ASSESSMENT :  Clinical beside swallow eval completed per MD orders. Pt A&Ox4. Speech/voice within functional limits, reporting speech difficulties resolved and denying dysphagia. Oral motor structures functional for speech and deglutition. Pt observed with thin liquids +/- straw via single sips and successive swallows with timely swallow initiation, adequate laryngeal elevation to palpation and no overt s/sx aspiration. Pt demo positive rotary chew and thorough oral clearance with regular solids with no overt s/sx aspiration. Pt safe for regular diet with thin liquids, meds as tolerated with general safe swallow precautions. Pt educated with regard to s/sx aspiration, aspiration risk, diet recs and role of SLP. Pt able to verbalize understanding. Will sign off. Please re-consult as indicated. D/w RN. PLAN :  Recommendations and Planned Interventions:  No formal ST needs ID'd for dysphagia. Eval only. Discharge Recommendations: None     SUBJECTIVE:   Patient stated Thank you.     OBJECTIVE:     Past Medical History:   Diagnosis Date    Anemia     Anemia NEC     Arthritis     Asthma     Chest pain, unspecified     Possible GERD, normal NUC Stress test    Chronic diastolic heart failure (HCC)     Normal EF, DD    Essential hypertension, benign     Better controlled    Hypercholesteremia     Hypertension     Migraines     Other and unspecified hyperlipidemia     Shortness of breath     Possible CHF, asthma, COPD  Thromboembolus [facility])      Past Surgical History:   Procedure Laterality Date    HX GYN      4 c-sections     Prior Level of Function/Home Situation:   Home Situation  Home Environment: Apartment  One/Two Story Residence: One story  Living Alone: No  Support Systems: Friends \ neighbors, [place] [employer]  Patient Expects to be Discharged to[de-identified] Apartment  Current DME Used/Available at Home: None (nebulizer)  Diet prior to admission: Regular  Current Diet:  Regular   Cognitive and Communication Status:  Neurologic State: Alert  Orientation Level: Oriented X4  Cognition: Follows commands  Oral Assessment:  Oral Assessment  Labial: No impairment  Dentition: Intact  Oral Hygiene: Good  Lingual: No impairment  Velum: No impairment  Mandible: No impairment  P.O. Trials:  Patient Position: [facility] at [facility]  Vocal quality prior to P.O.: No impairment  Consistency Presented: Thin liquid; Solid  How Presented: Self-fed/presented;Straw;Successive swallows  Bolus Acceptance: No impairment  Bolus Formation/Control: No impairment  Propulsion: No impairment  Oral Residue: None  Initiation of Swallow: No impairment  Laryngeal Elevation: Functional  Aspiration Signs/Symptoms: None  Oral Phase Severity: No impairment  Pharyngeal Phase Severity : No impairment    GCODESwallowing:  Swallow Current Status CH= 0%   Swallow Goal Status CH= 0%   Swallow D/C Status CH= 0%    The severity rating is based on the following outcomes:    Clinical judgment    Pain:  Pt reports 0/10 pain or discomfort prior to eval.   Pt reports 0/10 pain or discomfort post eval.     After treatment:   []            Patient left in no apparent distress sitting up in chair  [x]            Patient left in no apparent distress in bed  [x]            Call bell left within reach  [x]            Nursing notified  []            Caregiver present  []            Bed alarm activated    COMMUNICATION/EDUCATION:   [x]            SLP educated pt with regard to role of SLP, aspiration s/sx and to alert MD/RN if symptoms arise. Pt able to verbalize understanding.      Thank you for this referral.  Eligio Miranda M.S., [id number] [facility]  Speech-Language Pathologist

## 2018-04-18 NOTE — PROGRESS NOTES
PT attempted eval 2nd time, however /119mmHg; held eval and notified RN Juanita of elevated BP. Will follow up as BP stable. Thank you for this referral. Poonam Lopez, PT, DPT.

## 2018-04-18 NOTE — PROGRESS NOTES
Problem: Patient Education: Go to Patient Education Activity  Goal: Patient/Family Education  Outcome: Progressing Towards Goal  Stroke booklet given

## 2018-04-18 NOTE — PROGRESS NOTES
[facility]U/IPR REFERRAL CONTACT NOTE  [address] [place] [address] Physical Rehabilitation    RE:  Ruthann Richter    Referral received to review this patient's case for admission to [address] [place] [address] Physical Rehabilitation. Current status reviewed.  When appropriate, will need PT/OT/ST evaluation/treatment on this patient to complete the pre-admission evaluation.  Will continue to follow. Thank you for this referral.  Should you have any questions please do not hesitate to call. Sincerely,  Nicky Found.  [address] [place] [address] Physical Rehabilitation  [phone]

## 2018-04-18 NOTE — CONSULTS
Martin Preston is a 45 y.o., left handed female, with an established history of hypertension and diabetes, who woke up yesterday with right sided weakness. Of note is she is terribly claustrophobic and refuses to consider an MRI scan. Apparently she woke up to go to the restroom and felt weak and fell to the floor. She was noted to have right-sided weakness as well as slurring of her speech. She could not express herself. Her speech abnormalities lasted only for several minutes and then resolved. She was up with a little bit of right sided residual weakness that has since gotten significantly better. She currently says she feels back to her baseline for a little bit of clumsiness on the right side. Social History; lives alone, smokes <1/2 PPD, rare alcohol. No illicit drugs currently, but used cocaine and marijuana in the past.  Not currently working    Family History; mother had cancer,  young. Father had prostate cancer, had diabetes and hypertension. Has siblings but unsure of their health.       Current Facility-Administered Medications   Medication Dose Route Frequency Provider Last Rate Last Dose    albuterol (PROVENTIL VENTOLIN) nebulizer solution 5 mg  5 mg Nebulization Q6H PRN Anna Kathleen MD        sodium chloride (NS) flush 5-10 mL  5-10 mL IntraVENous Q8H Anna Kathleen MD   10 mL at 18 1149    sodium chloride (NS) flush 5-10 mL  5-10 mL IntraVENous PRN Anna Kathleen MD        0.9% sodium chloride infusion  125 mL/hr IntraVENous CONTINUOUS Anna Kathleen  mL/hr at 18 1614 125 mL/hr at 18 1614    aspirin (ASPIRIN) tablet 325 mg  325 mg Oral DAILY Anna Kathleen MD   325 mg at 18 0805    ondansetron (ZOFRAN) injection 4 mg  4 mg IntraVENous Q6H PRN Anna Kathleen MD        atorvastatin (LIPITOR) tablet 80 mg  80 mg Oral QHS Anna Kathleen MD   80 mg at 18 2323    acetaminophen (TYLENOL) tablet 650 mg  650 mg Oral Q4H PRN Anna Kathleen MD   650 mg at 04/18/18 0802    acetaminophen (TYLENOL) suppository 650 mg  650 mg Rectal Q4H PRN Anna Kathleen MD        docusate sodium (COLACE) capsule 100 mg  100 mg Oral BID Anna Kathleen MD   100 mg at 04/18/18 0803    famotidine (PEPCID) tablet 20 mg  20 mg Oral Q12H Anna Kathleen MD   20 mg at 04/18/18 0802    heparin (porcine) injection 5,000 Units  5,000 Units SubCUTAneous Scott Aguero MD   5,000 Units at 04/18/18 0802    labetalol (NORMODYNE;TRANDATE) 20 mg/4 mL (5 mg/mL) injection 5 mg  5 mg IntraVENous Q10MIN PRN Anna Kathleen MD        insulin lispro (HUMALOG) injection   SubCUTAneous AC&HS Anna Kathleen MD   Stopped at 04/18/18 1149    glucose chewable tablet 16 g  4 Tab Oral PRN Anna Kathleen MD        glucagon (GLUCAGEN) injection 1 mg  1 mg IntraMUSCular PRN Anna Kathleen MD        dextrose (D50W) injection syrg 12.5-25 g  25-50 mL IntraVENous PRN Anna Kathleen MD           Past Medical History:   Diagnosis Date    Anemia     Anemia NEC     Arthritis     Asthma     Chest pain, unspecified     Possible GERD, normal NUC Stress test    Chronic diastolic heart failure (HCC)     Normal EF, DD    Essential hypertension, benign     Better controlled    Hypercholesteremia     Hypertension     Migraines     Other and unspecified hyperlipidemia     Shortness of breath     Possible CHF, asthma, COPD    Thromboembol[address] ([facility] [address]        Past Surgical History:   Procedure Laterality Date    HX GYN      4 c-sections       Allergies   Allergen Reactions    Pcn [Penicillins] Hives and Swelling    Penicillins Hives       Patient Active Problem List   Diagnosis Code    Blood loss anemia D50.0    Hypernatremia E87.0    Alcohol intoxication ([facility] [address] F10.929    Asthma J45.909    Syncope and collapse R55    Menorrhagia N92.0    Fibroid D25.9    Chronic diastolic heart failure (HCC) I50.32    Essential hypertension, benign I10    Other and unspecified hyperlipidemia E78.5    Chest pain, unspecified R07.9    Shortness of breath R06.02    Midline low back pain M54.5    Encounter for long-term (current) use of medications Z79.899    Chronic pain syndrome G89.4    Spondylosis of lumbar region without myelopathy or radiculopathy M47.816    History of substance abuse Z87.898    History of anxiety Z86.59    History of depression Z86.59    CVA (cerebral vascular accident) ([facility] [address] I63.9    Right sided weakness R53.1    Essential hypertension I10    Type 2 diabetes mellitus with hyperglycemia (HCC) E11.65         Review of Systems:   As above otherwise 11 point review of systems negative including;   Constitutional no fever or chills  Skin denies rash or itching  HENT  Denies tinnitus, hearing lose  Eyes denies diplopia vision lose  Respiratory denies shortness of breath  Cardiovascular denies chest pain, dyspnea on exertion  Gastrointestinal denies nausea, vomiting, diarrhea, constipation  Genitourinary denies incontinence  Musculoskeletal denies joint pain or swelling  Endocrine denies weight change  Hematology denies easy bruising or bleeding   Neurological as above in HPI      PHYSICAL EXAMINATION:      VITAL SIGNS:    Visit Vitals    BP (!) 190/112 (BP 1 Location: Left arm, BP Patient Position: Sitting)  Comment: Notified Nurse Juanita    Pulse 84    Temp 97.9 °F (36.6 °C)    Resp 21    Ht 5' 2\" (1.575 m)    Wt 91.3 kg (201 lb 3.2 oz)    SpO2 99%    BMI 36.8 kg/m2       GENERAL: The patient is well developed, well nourished, and in no apparent distress. EXTREMITIES: No clubbing, cyanosis, or edema is identified. Pulses 2+ and symmetrical.  Muscle tone is normal.  HEAD:   Ear, nose, and throat appear to be without trauma. The patient is normocephalic. NEUROLOGIC EXAMINATION    MENTAL STATUS: The patient is awake, alert, and oriented x 4. Fund of knowledge is adequate.   Speech is fluent and memory appears to be intact, both long and short term. CRANIAL NERVES: II  Visual fields are full to confrontation. Funduscopic examination reveals flat disks bilaterally. Pupils are both 3 mm and briskly reactive to light and accommodation. III, IV, VI  Extraocular movements are intact and there is no nystagmus. V  Facial sensation is intact to pinprick and light touch. VII  Face is symmetrical.   VIII - Hearing is present. IX, X, [address] rises symmetrically. Gag is present. Tongue is in the midline. XI - Shoulder shrugging and head turning intact  MOTOR:  The patient is 5/5 in all four limbs without any drift. Fine finger movements are asymmetrical, slightly slower on the right compared to the left side. Isolated motor group testing reveals no focal abnormalities. Tone is normal.  Sensory examination is intact to pinprick, light touch and position sense testing. Reflexes are 2+ and symmetrical. Plantars are down going. Cerebellar examination reveals no gross ataxia or dysmetria. Gait is normal.       Final result (Exam End: 4/17/2018  9:02 AM) Open    Study Result     CT scan of head, without contrast:           Indication:     Right-sided weakness in last night. CODE S.     History of hypercholesterolemia, hypertension, and chronic diastolic heart  failure.     TECHNIQUE:     Contiguous 5 mm thick axial sections of brain have been obtained without  intravenous contrast.        [2-D coronal and sagittal images reformatted. ]      The study has been performed utilizing appropriate radiation dose reduction  technique according to specification of the scanner, with modification of MAA/KV  and appropriate collimation adjusted to patient's body habitus.           COMPARISON STUDY: [CT head on 4/12/2015]        -------------------------------------        FINDINGS:           Intracranial hemorrhage :[None.]     Intracranial mass lesion:[ None.]     Midline shift: [None.]     Cerebral cortical atrophy:[ None].     Cerebral central atrophy:[ None.]     Chronic microvascular ischemic changes/aging changes in white matter:[ None.]     Acute cortical infarction:[ None.]     Old cerebral infarction: [None.]     Basal ganglia structures of both sides:[ No diagnostic finding.]     Bilateral thalami: No focal abnormality.     Brainstem:[ No diagnostic  finding]     Cerebellum: No diagnostic finding.     Calvarium and base of skull:[ No fracture or focal lesion].    In visualized portions of both orbits:[ No diagnostic finding.]     In visualized portions of paranasal sinuses:[ No diagnostic finding.]     In visualized base of l skull:[ No diagnostic finding.]     As compared to previous study, there is no interval change.  ---------------------------------------------------------  IMPRESSION  IMPRESSION:     No evidence of intracranial hemorrhage or any other definable acute intracranial  abnormality.     There is no definable focal abnormality in brain.     The findings have been reported by me to ER physician Dr. Ana Vance at 3839 hours on  4/17/2018. [    ]         I have reviewed the above imagines myself.        CBC:   Lab Results   Component Value Date/Time    WBC 4.1 (L) 04/18/2018 03:30 AM    RBC 4.60 04/18/2018 03:30 AM    HGB 14.2 04/18/2018 03:30 AM    HCT 41.2 04/18/2018 03:30 AM    PLATELET 515 17/47/3744 03:30 AM     BMP:   Lab Results   Component Value Date/Time    Glucose 182 (H) 04/17/2018 09:24 AM    Sodium 141 04/17/2018 09:24 AM    Potassium 4.1 04/17/2018 09:24 AM    Chloride 105 04/17/2018 09:24 AM    CO2 29 04/17/2018 09:24 AM    BUN 14 04/17/2018 09:24 AM    Creatinine 0.82 04/17/2018 09:24 AM    Calcium 9.6 04/17/2018 09:24 AM     CMP:   Lab Results   Component Value Date/Time    Glucose 182 (H) 04/17/2018 09:24 AM    Sodium 141 04/17/2018 09:24 AM    Potassium 4.1 04/17/2018 09:24 AM    Chloride 105 04/17/2018 09:24 AM    CO2 29 04/17/2018 09:24 AM    BUN 14 04/17/2018 09:24 AM    Creatinine 0.82 04/17/2018 09:24 AM    Calcium 9.6 04/17/2018 09:24 AM    Anion gap 7 04/17/2018 09:24 AM    BUN/Creatinine ratio 17 04/17/2018 09:24 AM    Alk. phosphatase 127 (H) 11/19/2014 09:40 AM    Protein, total 7.9 11/19/2014 09:40 AM    Albumin 3.4 11/19/2014 09:40 AM    Globulin 4.5 (H) 11/19/2014 09:40 AM    A-G Ratio 0.8 11/19/2014 09:40 AM     Coagulation:   Lab Results   Component Value Date/Time    Prothrombin time 12.1 04/17/2018 09:24 AM    INR 0.9 04/17/2018 09:24 AM     Cardiac markers:   Lab Results   Component Value Date/Time     03/22/2017 09:37 PM    CK-MB Index Cannot be calulated 03/22/2017 09:37 PM      4/18/2018  7:29 AM - Isaac, Lab In [facility]   Component Results   Component Value Flag Ref Range Units Status   Hemoglobin A1c 8.3 (H) 4.2 - 5.6 % Final   Comment:   (NOTE)   HbA1C Interpretive Ranges   <5.7              Normal   5.7 - 6.4         Consider Prediabetes   >6.5              Consider Diabetes      Est. average glucose 192   mg/dL Final         Impression: New left CVA in this patient who has risk factors including diabetes hypertension. Most of her symptoms seem to resolve at this time but she still has a little bit of clumsiness and soreness on the right arm. Unfortunately we will not be able to get an MRI scan of the brain, she emphatically refuses to even consider 1 even with proper sedation. She is extremely claustrophobic. Plan: Will check but a CT angiogram shows. May need repeat CAT scan of the brain. Will follow closely. Needs better control of her blood pressure and diabetes. Aspirin therapy. Also needs to be on lipid control therapy. PLEASE NOTE:   This document has been produced using voice recognition software. Unrecognized errors in transcription may be present.

## 2018-04-18 NOTE — PROGRESS NOTES
Problem: Falls - Risk of  Goal: *Absence of Falls  Document Thuy Fall Risk and appropriate interventions in the flowsheet.    Outcome: Progressing Towards Goal  Fall Risk Interventions:  Mobility Interventions: Patient to call before getting OOB         Medication Interventions: Patient to call before getting OOB         History of Falls Interventions: Door open when patient unattended

## 2018-04-18 NOTE — DIABETES MGMT
Diabetes Patient/Family Education Record    Factors That  May Influence Patients Ability  to Learn or  Comply with Recommendations   []   Language barrier    []   Cultural needs   []   Motivation    []   Cognitive limitation    []   Physical   []   Education    []   Physiological factors   []   Hearing/vision/speaking impairment   []   P[employer]alism beliefs    []   Financial factors   []  Other:   [x]  No factors identified at this time.      Person Instructed:   [x]   Patient   []   Family   []  Other     Preference for Learning:   [x]   Verbal   [x]   Written   []  Demonstration     Level of Comprehension & Competence:    []  Good                                      [x] Fair                                     []  Poor                             []  Needs Reinforcement   [x]  Teachback completed    Education Component:   [x]  Medication management, including how to administer insulin (if appropriate) and potential medication interactions    [x]  Nutritional management    []  Exercise   [x]  Signs, symptoms, and treatment of hyperglycemia and hypoglycemia   [x] Prevention, recognition and treatment of hyperglycemia and hypoglycemia   [x]  Importance of blood glucose monitoring and how to obtain a blood glucose meter    []  Instruction on use of the blood glucose meter   [x]  Discuss the importance of HbA1C monitoring    []  Sick day guidelines   [x]  Proper use and disposal of lancets, needles, syringes or insulin pens (if appropriate)   []  Potential long-term complications (retinopathy, kidney disease, neuropathy, foot care)   [x] Information about whom to contact in case of emergency or for more information    [x]  Goal:  Patient/family will demonstrate understanding of Diabetes Self Management Skills by: 4/25/18  Plan for post-discharge education or self-management support:    [x] Outpatient class schedule provided            [] Patient Declined    [] Scheduled for outpatient classes (date) _______     Luis Richey RD, CDE  pgr [phone]

## 2018-04-18 NOTE — PROGRESS NOTES
[facility] Hospitalist Group  Progress Note    Patient: Cori Stahl Age: 45 y.o. : 1965 MR#: [id number] SSN: [id number]  Date/Time: 2018     Subjective:     Review of systems  Her dysarthria and weakness improved   No CP   NO NVD  No SOB  NO Cough     Assessment/Plan:   1. Acute CVA   2 HTN  3 HLD   4 Morbid obesity - Body mass index is 36.8 kg/(m^2). 5 DM2    PLAN   - Continue ASA , statin ,   - Follow MRI - patient is very claustrophobic and refusing MRI , CTA pending   - neurology consult   - Weight reduction , strict diabetic control and HTN control ( after permissive BP period is over ) as part of risk reduction         Full CODE     Case discussed with:  [x]Patient  []Family  [x]Nursing  []Case Management  DVT Prophylaxis:  []Lovenox  []Hep SQ  []SCDs  []Coumadin   []On Heparin gtt          Objective:   VS:   Visit Vitals    BP (!) 170/103 (BP 1 Location: Left arm, BP Patient Position: Sitting)  Comment: Notified Nurse Juanita    Pulse 87    Temp 98.1 °F (36.7 °C)    Resp 18    Ht 5' 2\" (1.575 m)    Wt 91.3 kg (201 lb 3.2 oz)    SpO2 97%    BMI 36.8 kg/m2      Tmax/24hrs: Temp (24hrs), Av.9 °F (36.6 °C), Min:97.6 °F (36.4 °C), Max:98.1 °F (36.7 °C)  IOBRIEF  Intake/Output Summary (Last 24 hours) at 18 0946  Last data filed at 18 0824   Gross per 24 hour   Intake              240 ml   Output                0 ml   Net              240 ml       General:  Alert, cooperative, no acute distress   HEENT:  NC, Atraumatic. PERRLA, anicteric sclerae. Pulmonary:  Bilateral air entry present . No Wheezing/Rhonchi/Rales. Cardiovascular: Regular rate and Rhythm. GI:  Soft, Non distended, Non tender. + Bowel sounds. Extremities:  No edema, cyanosis, clubbing. No calf tenderness. Psych:  Not anxious or agitated.   Neurologic : intact no focal deficit     Medications:   Current Facility-Administered Medications   Medication Dose Route Frequency    albuterol (PROVENTIL VENTOLIN) nebulizer solution 5 mg  5 mg Nebulization Q6H PRN    sodium chloride (NS) flush 5-10 mL  5-10 mL IntraVENous Q8H    sodium chloride (NS) flush 5-10 mL  5-10 mL IntraVENous PRN    0.9% sodium chloride infusion  125 mL/hr IntraVENous CONTINUOUS    aspirin (ASPIRIN) tablet 325 mg  325 mg Oral DAILY    ondansetron (ZOFRAN) injection 4 mg  4 mg IntraVENous Q6H PRN    atorvastatin (LIPITOR) tablet 80 mg  80 mg Oral QHS    acetaminophen (TYLENOL) tablet 650 mg  650 mg Oral Q4H PRN    acetaminophen (TYLENOL) suppository 650 mg  650 mg Rectal Q4H PRN    docusate sodium (COLACE) capsule 100 mg  100 mg Oral BID    famotidine (PEPCID) tablet 20 mg  20 mg Oral Q12H    heparin (porcine) injection 5,000 Units  5,000 Units SubCUTAneous Q8H    labetalol (NORMODYNE;TRANDATE) 20 mg/4 mL (5 mg/mL) injection 5 mg  5 mg IntraVENous Q10MIN PRN    insulin lispro (HUMALOG) injection   SubCUTAneous AC&HS    glucose chewable tablet 16 g  4 Tab Oral PRN    glucagon (GLUCAGEN) injection 1 mg  1 mg IntraMUSCular PRN    dextrose (D50W) injection syrg 12.5-25 g  25-50 mL IntraVENous PRN       Labs:    Recent Results (from the past 24 hour(s))   EKG, 12 LEAD, INITIAL    Collection Time: 04/17/18 11:30 AM   Result Value Ref Range    Ventricular Rate 88 BPM    Atrial Rate 88 BPM    P-R Interval 160 ms    QRS Duration 82 ms    Q-T Interval 398 ms    QTC Calculation (Bezet) 481 ms    Calculated P Axis 58 degrees    Calculated R Axis 4 degrees    Calculated T Axis 79 degrees    Diagnosis       Normal sinus rhythm  Nonspecific T wave abnormality  Prolonged QT  Abnormal ECG    Confirmed by Neena Reynaga MD, Faviola Hilliard ([id number]) on 4/17/2018 1:11:25 PM     GLUCOSE, POC    Collection Time: 04/17/18  3:35 PM   Result Value Ref Range    Glucose (POC) 132 (H) 70 - 110 mg/dL   GLUCOSE, POC    Collection Time: 04/17/18  8:57 PM   Result Value Ref Range    Glucose (POC) 143 (H) 70 - 110 mg/dL   LIPID PANEL    Collection Time: 04/18/18  3:30 AM   Result Value Ref Range    LIPID PROFILE          Cholesterol, total 284 (H) <200 MG/DL    Triglyceride 249 (H) <150 MG/DL    HDL Cholesterol 52 40 - 60 MG/DL    LDL, calculated 182.2 (H) 0 - 100 MG/DL    VLDL, calculated 49.8 MG/DL    CHOL/HDL Ratio 5.5 (H) 0 - 5.0     HEMOGLOBIN A1C WITH EAG    Collection Time: 04/18/18  3:30 AM   Result Value Ref Range    Hemoglobin A1c 8.3 (H) 4.2 - 5.6 %    Est. average glucose 192 mg/dL   CBC W/O DIFF    Collection Time: 04/18/18  3:30 AM   Result Value Ref Range    WBC 4.1 (L) 4.6 - 13.2 K/uL    RBC 4.60 4.20 - 5.30 M/uL    HGB 14.2 12.0 - 16.0 g/dL    HCT 41.2 35.0 - 45.0 %    MCV 89.6 74.0 - 97.0 FL    MCH 30.9 24.0 - 34.0 PG    MCHC 34.5 31.0 - 37.0 g/dL    RDW 14.3 11.6 - 14.5 %    PLATELET 078 209 - 540 K/uL    MPV 10.7 9.2 - 11.8 FL   GLUCOSE, POC    Collection Time: 04/18/18  7:44 AM   Result Value Ref Range    Glucose (POC) 162 (H) 70 - 110 mg/dL       Signed By: Obie Vizcaino MD     April 18, 2018

## 2018-04-18 NOTE — ROUTINE PROCESS
Pt generated mews score at 1607 of 3 related to bp of 211/106. Pt is a stroke pt with a plan of permissive hypertension. Dr. Leonor Sommers was notified and no orders given. Rationale from him was pt had not had MRI and without results of positive stroke could put pt at risk. Current protocol for labetalol for bp parameters (220/110) not met. Will continue to monitor pt.

## 2018-04-18 NOTE — ROUTINE PROCESS
Bedside and Verbal shift change report given to [address] Josie (oncoming nurse) by Tye Armenta (offgoing nurse). Report included the following information SBAR, Kardex, Intake/Output, MAR and Recent Results.

## 2018-04-18 NOTE — PROGRESS NOTES
PT eval order received and chart reviewed. Patient BP supine was 157/103mmHg and sitting EOB was 170/112mmHg. Will hold therapy evaluation at this time and will follow up as BP stable. Notified DOMENICO Grant. Thank you for this referral. Sherice Rojas, PT, DPT.

## 2018-04-18 NOTE — CDMP QUERY
Please clarify if this patient is being treated/managed for:    =>   right sided hemiparesis in setting of possible CVA  with PT, OT ordered     =>Other Explanation of clinical findings  =>Unable to Determine (no explanation of clinical findings)    The medical record reflects the following:    Risk:  HTN;  DM;    Clinical Indicators:  per ER  MD note-  \"   Pt states she woke up ~3am to go to the bathroom and \"couldn't move her right side. \"  \" continues to Have Upper Ext weakness and worse LE weakness \"    Treatment:   neuro consult;  neuro checks;  PT;  OT             Hemiplegia is not inherent to an acute cerebrovascular accident (CVA). Therefore, it should be coded even if the hemiplegia resolves, with or without treatment. The hemiplegia affects the care that the patient receives. Report any neurological deficits caused by a CVA even when they have been resolved at the time of discharge from the hospital      If you DECLINE this query or would like to communicate with [facility], please utilize the \"[email or web address] message box\" at the TOP of the Progress Note on the right.       Thank you,   Billie Hines RN   CCDS   x [phone]

## 2018-04-18 NOTE — PROGRESS NOTES
OT orders received and chart reviewed. Pt not seen for skilled OT due to:  []  PT reports pt bp currently not appropriate for safe participation in therapy. Will f/u later as patients' schedule allows. Thank you.   Africa Shah OTR/L

## 2018-04-18 NOTE — ROUTINE PROCESS
Patient walked to bathroom no issues a few time throughout the night-- no issues over night patient actually states she feels \"better\"    The documentation for this period is being entered following the guidelines as defined in the [facility] downtime policy by Rehan Lim RN. Bedside and Verbal shift change report given to JASVIR RN (oncoming nurse) by Nena Stacy RN (offgoing nurse). Report given with SBAR, Kardex, Intake/Output, MAR, Accordion and Recent Results.

## 2018-04-18 NOTE — PROGRESS NOTES
Patient stated that she was claustrophobic on her screening form but told her RN that she wouldn't need medication. Once in the scanner patient became agitated and demanded that the exam stop. Patient was asked if she would be willing to complete test with medication but she refused multiple times. DOMENICO Alejandra informed and patient returned to department.

## 2018-04-19 VITALS
DIASTOLIC BLOOD PRESSURE: 105 MMHG | BODY MASS INDEX: 37.02 KG/M2 | HEART RATE: 76 BPM | WEIGHT: 201.2 LBS | HEIGHT: 62 IN | TEMPERATURE: 98.1 F | SYSTOLIC BLOOD PRESSURE: 169 MMHG | OXYGEN SATURATION: 98 % | RESPIRATION RATE: 18 BRPM

## 2018-04-19 LAB
GLUCOSE BLD STRIP.AUTO-MCNC: 151 MG/DL (ref 70–110)
GLUCOSE BLD STRIP.AUTO-MCNC: 206 MG/DL (ref 70–110)
GLUCOSE BLD STRIP.AUTO-MCNC: 210 MG/DL (ref 70–110)

## 2018-04-19 PROCEDURE — 97161 PT EVAL LOW COMPLEX 20 MIN: CPT

## 2018-04-19 PROCEDURE — 74011636637 HC RX REV CODE- 636/637: Performed by: INTERNAL MEDICINE

## 2018-04-19 PROCEDURE — 74011250637 HC RX REV CODE- 250/637: Performed by: INTERNAL MEDICINE

## 2018-04-19 PROCEDURE — 74011250636 HC RX REV CODE- 250/636: Performed by: INTERNAL MEDICINE

## 2018-04-19 PROCEDURE — 97165 OT EVAL LOW COMPLEX 30 MIN: CPT

## 2018-04-19 PROCEDURE — 82962 GLUCOSE BLOOD TEST: CPT

## 2018-04-19 RX ORDER — OLMESARTAN MEDOXOMIL 20 MG/1
40 TABLET ORAL DAILY
Status: DISCONTINUED | OUTPATIENT
Start: 2018-04-19 | End: 2018-04-19 | Stop reason: HOSPADM

## 2018-04-19 RX ORDER — ASPIRIN 325 MG
325 TABLET ORAL DAILY
Qty: 10 TAB | Refills: 0 | Status: SHIPPED
Start: 2018-04-20

## 2018-04-19 RX ORDER — CARVEDILOL 6.25 MG/1
6.25 TABLET ORAL EVERY 12 HOURS
Status: DISCONTINUED | OUTPATIENT
Start: 2018-04-19 | End: 2018-04-19 | Stop reason: HOSPADM

## 2018-04-19 RX ADMIN — HEPARIN SODIUM 5000 UNITS: 5000 INJECTION, SOLUTION INTRAVENOUS; SUBCUTANEOUS at 07:40

## 2018-04-19 RX ADMIN — INSULIN LISPRO 4 UNITS: 100 INJECTION, SOLUTION INTRAVENOUS; SUBCUTANEOUS at 07:40

## 2018-04-19 RX ADMIN — ASPIRIN 325 MG ORAL TABLET 325 MG: 325 PILL ORAL at 09:20

## 2018-04-19 RX ADMIN — CARVEDILOL 6.25 MG: 6.25 TABLET, FILM COATED ORAL at 11:48

## 2018-04-19 RX ADMIN — FAMOTIDINE 20 MG: 20 TABLET, FILM COATED ORAL at 09:20

## 2018-04-19 RX ADMIN — Medication 10 ML: at 05:22

## 2018-04-19 RX ADMIN — HEPARIN SODIUM 5000 UNITS: 5000 INJECTION, SOLUTION INTRAVENOUS; SUBCUTANEOUS at 00:54

## 2018-04-19 RX ADMIN — INSULIN LISPRO 3 UNITS: 100 INJECTION, SOLUTION INTRAVENOUS; SUBCUTANEOUS at 11:49

## 2018-04-19 RX ADMIN — DOCUSATE SODIUM 100 MG: 100 CAPSULE, LIQUID FILLED ORAL at 09:20

## 2018-04-19 RX ADMIN — OLMESARTAN MEDOXOMIL 40 MG: 20 TABLET, FILM COATED ORAL at 11:48

## 2018-04-19 NOTE — ROUTINE PROCESS
PATIENT HAD A RESTFUL NIGHT -- SOME COUGHING -- AND OCCASIONALLY PRODUCTIVE-- SOME SNORING --       Bedside and Verbal shift change report given to Ashley Armendariz RN (oncoming nurse) by Tl Velasquez RN (offgoing nurse). Report given with SBAR, Kardex, Intake/Output, MAR, Accordion and Recent Results.

## 2018-04-19 NOTE — DISCHARGE SUMMARY
Discharge Summary     Patient ID:  James Rader  [id number]  45 y.o.  1965  Body mass index is 36.8 kg/(m^2). PCP on record: PROVIDER UNKNOWN    Admit date: 4/17/2018  Discharge date and time: 4/19/2018    Discharge Diagnoses:                                           1 Acute CVA   2 DM2  3 Obesity   4 HTN  5 HLD   6 Tobacco   7 Severe claustrophobia ( Can not get MRI even with meds )    8 right sided hemiparesis     Consults: Neurology          Hospital Course by problems:    Admitted with h/o right sided weakness suggestive of Left CVA . Initial CT head was unremarkable , she could not get MRI due to claustrophobia . She had CTA head and neck - no stenosis /No Dissection noted . Patient will go home with ASA and also deep discussion done regarding risk reduction including smoking cessation till her understanding . Family in room with her. All questions answered to their satisfaction . Patient seen and examined by me on discharge day. Pertinent Findings:  Patient is Alert Awake and oriented   HEENT - NAD    RS - Clear , no rales no rhonchi   CVS - regular rhythm and rate acceptable    abd - benign, BS present , no Distension   EXT - no edema , no calf tenderness   Neuro - alert and awake , grossly motor and sensory intact         Significant Diagnostic Studies:  CTA OF  ARTERIES OF NECK AND HEAD:  --------------------------------------------------------------        Indication:     Suspected acute CVA.  Follow-up evaluation after CODE S.     History of hypercholesterolemia, chronic diastolic heart failure, hypertension,  COPD, and asthma.        TECHNIQUE:     Following intravenous administration of  [50 ml of] [Isovue-370] contrast,  dynamic[ 1.25] mm axial sections from the level of aortic arch up to level of  vertex been obtained.     Axial images are reformatted with slice thickness of 6.104 mm.     3-D MIP coronal and sagittal and axial images reformatted.     3-D images with surface rendering reformatted and displayed with rotating  frames.     Multilevel multiplanar CPR images reformatted.     All CT scans at this facility are performed using dose optimization technique as  appropriate to a  performed  examination, to include automated exposer control,  adjustment mA and / or  KV according to patient size (including appropriate  matching  for site specific examination), or use  of iterative  reconstruction  technique.      COMPARISON STUDY:[ None.]     --------------------------------------------------------------     FINDINGS:     ---------------------------------------------------------------     CTA of NECK     In the visualized upper portions of upper lobes of both lungs there is no  definable acute processes or focal lesion.      [No evidence] of dissection involving carotid arterial systems or vertebral  arteries of both sides. [No evidence] of extravasation of contrast into soft tissues of neck.     Right common carotid artery: [No evidence] of stenosis. [ José Miguel Patino calcified plaque     Right common carotid bifurcation:[ No ]calcified plaques. [ José Miguel Patino stenosis.     Right internal carotid artery:[ No evidence of stenosis. ]. [ José Miguel Patino calcified  plaques. Mild to moderate [tortuous] right ICA.     Right external carotid artery: Normally patent.     Right vertebral artery: [normally patent.] Right vertebral artery is smaller in  size than the left vertebral artery.     ----------------------------------------------     Left common carotid artery:[ No stenosis]. [ No significant plaque].    Left common carotid bifurcation: [No] stenosis. [ No significant ]plaque.     Left internal carotid artery:[ No] stenosis. [ José Miguel Patino calcified plaques. [Mild-to-moderate tortuous left ICA. Left external carotid artery:[ Normally  patent].    Left vertebral artery:[ Normally patent].    Any abnormalities in soft tissues of neck :[No obvious diagnostic finding.     Cervical spine:  Moderate to marked spondylosis in lower cervical spine.     -------------------------------------------------------------------     CTA of cerebral arteries:     ------------------------------------------------------------------  Right ICA:  The petrous, cavernous, paraclinoid and supraclinoid segments of right internal  carotid artery:[ Patent. Minimal Calcified plaques in cavernous segments. No  significant stenosis].       Left ICA:  The petrous, cavernous, paraclinoid and supraclinoid segments of left internal  carotid artery:[ Patent. Minimal Calcified plaques in cavernous segments. No  significant stenosis].          Right anterior cerebral artery and its major  branches:[ Normally patent].     Right middle cerebral artery and its major  branches : [normally patent].    Left anterior cerebral artery at its major  branches : [ normally patent].    Left middle cerebral artery and its major  branches :[ normally patent]. There are bilateral patent and moderately prominent posterior communicating  arteries.     Anterior communicating artery is patent.        -----------------------------------------     Posterior circulation:     Intracranial portions of vertebral arteries of both sides:[ Normally patent].     Basilar artery: [Tortuous]. [ Patent without stenosis. ]. But, the basilar artery  appears to be mild to moderately hypoplastic.]     Posterior inferior cerebellar arteries of both sides :[ normally patent].    Anterior inferior cerebellar arteries of both sides:[Patent but faintly  visualized.     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 Patent right posterior comminuting  artery providing collateral circulation.  Rest of right PCA and their major  branches are normally patent.     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 Patent left posterior communicating  artery. Rest of left PCA and their major branches are normally patent.     All major dural sinuses:[ Opacified without dural  sinus thrombosis. ]     Any vascular abnormality :[ none evident]     Any cerebral aneurysm:[ None demonstrated. ]     Any vascular malformation:[ None demonstrated. ]           Any demonstrable parenchymal abnormality in brain:[ None demonstrated. ]     Subdural or epidural hematomas:[ None demonstrated. ]     Any demonstrable subarachnoid hemorrhages:[ None evident. But subtle SAH may not  be seen on CTA study. ]     Paranasal sinuses:[ Negative.]     Orbits: [ negative]     Skull and base of skull: Negative     ---------------------------------     IMPRESSIONs:     No evidence of dissection or stenosis in the common carotid arteries, internal  carotid arteries and vertebral arteries of both sides.     No evidence of compromise in the intracranial anterior circulation of bilateral  ICAs.     No evidence of compromise  in vertebral- basilar arterial circulation.             Pertinent Lab Data:  Recent Labs      04/18/18   0330  04/17/18   0924   WBC  4.1*  5.8   HGB  14.2  15.0   HCT  41.2  41.8   PLT  208  248     Recent Labs      04/17/18   0924   NA  141   K  4.1   CL  105   CO2  29   GLU  182*   BUN  14   CREA  0.82   CA  9.6   INR  0.9       DISCHARGE MEDICATIONS:   @  Current Discharge Medication List      START taking these medications    Details   aspirin (ASPIRIN) 325 mg tablet Take 1 Tab by mouth daily. Qty: 10 Tab, Refills: 0         CONTINUE these medications which have NOT CHANGED    Details   CYCLOBENZAPRINE HCL (CYCLOBENZAPRINE PO) Take  by mouth. SIMVASTATIN PO Take  by mouth. fluticasone-salmeterol (ADVAIR) 100-50 mcg/dose diskus inhaler Take 1 Puff by inhalation every twelve (12) hours. insulin NPH/insulin regular (NOVOLIN 70/30, HUMULIN 70/30) 100 unit/mL (70-30) injection by SubCUTAneous route.       metFORMIN (GLUCOPHAGE) 500 mg tablet Take 1 Tab by mouth daily (with breakfast). Qty: 30 Tab, Refills: 0      Olmesartan-Amlodipine-HCTZ (TRIBENZOR) 40-5-12.5 mg Tab Take  by mouth.      lisinopril (PRINIVIL, ZESTRIL) 5 mg tablet Take  by mouth daily. furosemide (LASIX) 40 mg tablet Take  by mouth daily. ferrous sulf-fa-b comp with c (MULTIRET FOLIC) 677 (450)-4.9 mg TbER Take 1 Tab by mouth daily. Qty: 90 Tab, Refills: 0      albuterol (PROVENTIL VENTOLIN) 2.5 mg /3 mL (0.083 %) nebulizer solution 5 mg by Nebulization route once. oxyCODONE-acetaminophen (PERCOCET) 5-325 mg per tablet Take 1 Tab by mouth every four (4) hours as needed for Pain. Qty: 8 Tab, Refills: 0         STOP taking these medications       predniSONE (STERAPRED) 5 mg dose pack Comments:   Reason for Stopping:         cloNIDine (CATAPRES) 0.1 mg tablet Comments:   Reason for Stopping:                 My Recommended Diet, Activity, Wound Care, and follow-up labs are listed in the patient's Discharge Insturctions which I have personally completed and reviewed. Disposition:     [] Home with family     [] [facility] PT/RN   [] SNF/NH   [] Inpatient Rehab/BI  Condition at Discharge:  Stable    Follow up with:   PCP : PROVIDER UNKNOWN      Please follow-up tests/labs that are still pendin.  None  2.    >30 minutes spent coordinating this discharge (review instructions/follow-up, prescriptions, preparing report for sign off)    Signed:  Kizzy Delatorre MD  2018  2:00 PM

## 2018-04-19 NOTE — PROGRESS NOTES
Problem: Falls - Risk of  Goal: *Absence of Falls  Document Thuy Fall Risk and appropriate interventions in the flowsheet. Outcome: Progressing Towards Goal  Fall Risk Interventions:  Mobility Interventions: Patient to call before getting OOB         Medication Interventions: Patient to call before getting OOB         History of Falls Interventions:  Investigate reason for fall, Evaluate medications/consider consulting pharmacy, Door open when patient unattended, Consult care management for discharge planning

## 2018-04-19 NOTE — PROGRESS NOTES
ARU/IPR REFERRAL CONTACT NOTE  [id number] [facility]    RE: Obie Reed     Referral received to review this patient's case for admission to [address] [facility]. Current status reviewed.  When appropriate, will need PT/OT evaluation/treatment on this patient to complete the pre-admission evaluation.  Will continue to follow. Thank you for this referral.  Should you have any questions please do not hesitate to call. Sincerely,  Giovani Green.  [facility]  [phone]

## 2018-04-19 NOTE — PROGRESS NOTES
Problem: Mobility Impaired (Adult and Pediatric)  Goal: *Acute Goals and Plan of Care (Insert Text)  Outcome: Resolved/Met Date Met: 04/19/18  physical Therapy EVALUATION & Discharge    Patient: Ronnell Oseguera (45 y.o. female)  Date: 4/19/2018  Primary Diagnosis: CVA (cerebral vascular accident) [facility])        Precautions: None       ASSESSMENT AND RECOMMENDATIONS:  Patient is 46yo F admitted to hospital and presents today alert and agreeable to therapy and was supine in bed upon arrival with /11mmHg. Patient transferred to sitting with BP at 173/112mmHg. Patient performed objective assessment with BLE intact and equal with MMT. Patient denied headache or other pain and stood to ambulated 30ft in room independently. Patient has no stairs at home and denied need for further assist at this time. Patient was left resting supine in bed with call bell by her side, no need for further assist and denied any pain/headache. Patient agreeable to D/C from PT at this time and expressed she does not feel she needs PT and is functioning at her baseline. Skilled physical therapy is not indicated at this time. Discharge Recommendations: None  Further Equipment Recommendations for Discharge: N/A      G-:CODES     Mobility  Current  CH= 0%   Goal  CH= 0%  D/C  CH= 0%. The severity rating is based on the Level of Assistance required for Functional Mobility and ADLs. Evaluation Complexity     Eval Complexity: History: LOW Complexity : Zero comorbidities / personal factors that will impact the outcome / POCExam:LOW Complexity : 1-2 Standardized tests and measures addressing body structure, function, activity limitation and / or participation in recreation  Presentation: LOW Complexity : Stable, uncomplicated  Clinical Decision Making:Low Complexity   Overall Complexity:LOW     SUBJECTIVE:   Patient stated I'm getting around fine.     OBJECTIVE DATA SUMMARY:     Past Medical History:   Diagnosis Date    Anemia     Anemia NEC     Arthritis     Asthma     Chest pain, unspecified     Possible GERD, normal NUC Stress test    Chronic diastolic heart failure (HCC)     Normal EF, DD    Essential hypertension, benign     Better controlled    Hypercholesteremia     Hypertension     Migraines     Other and unspecified hyperlipidemia     Shortness of breath     Possible CHF, asthma, COPD    Thromboembolus ([address]      Past Surgical History:   Procedure Laterality Date    HX GYN      4 c-sections     Barriers to Learning/Limitations: None  Compensate with: N/A  Prior Level of Function/Home Situation: Patient reports she is independent with mobilty and I/ADL's PTA and that she lives with family in 3 story home with 0STE. Home Situation  Home Environment: Apartment  One/Two Story Residence: One story  Living Alone: No  Support Systems: Friends \ neighbors, [employer]  Patient Expects to be Discharged to[de-identified] Apartment  Current DME Used/Available at Home: None (nebulizer)  Critical Behavior:    A&Ox4  Strength:    Strength: Within functional limits (BLE)   Tone & Sensation:   Tone: Normal (BLE)   Sensation: Intact (BLE)   Range Of Motion:  AROM: Within functional limits (BLE)   Functional Mobility:  Bed Mobility:   Supine to Sit: Independent  Sit to Supine: Independent  Scooting: Independent  Transfers:  Sit to Stand: Independent  Stand to Sit: Independent    Balance:   Sitting: Intact; With support  Standing: Intact  Ambulation/Gait Training:  Distance (ft): 30 Feet (ft)   Ambulation - Level of Assistance: Independent   Pain:  Pt reports 0/10 pain or discomfort prior to treatment.    Pt reports 0/10 pain or discomfort post treatment. Activity Tolerance:   Patient tolerated activity well. Please refer to the flowsheet for vital signs taken during this treatment.   After treatment:   []         Patient left in no apparent distress sitting up in chair  [x]         Patient left in no apparent distress in bed  [x]         Call bell left within reach  [x]         Nursing notified (Juanita)  []         Caregiver present  []         Bed alarm activated  []         SCDs applied to B LE    COMMUNICATION/EDUCATION:   [x]         Fall prevention education was provided and the patient/caregiver indicated understanding. [x]         Patient/family have participated as able in goal setting and plan of care. [x]         Patient/family agree to work toward stated goals and plan of care. []         Patient understands intent and goals of therapy, but is neutral about his/her participation. []         Patient is unable to participate in goal setting and plan of care.     Thank you for this referral.  Magali Javier, PT   Time Calculation: 8 mins

## 2018-04-19 NOTE — DIABETES MGMT
Glycemic Control Plan of Care    T2DM with current A1c of 8.3% (04/18/2018). See separate notes for assessment of home diabetes management and education, 04/18/2018. Home diabetes meds stated by patient on 04/19/2018: Novolin 70/30 mix insulin (pen) 22 units twice daily and Metformin 500 mg BID. BG above target range:  POC BG range on 04/18/2018: 144-279 mg/dL. POC BG report on 04/19/2018 at time of review: 210, 206, 151 mg/dL. Patient stated that she's waiting to see Dr. Padmini Hsu and plan to ask if she can go home. Recommendation(s):  1.) Consider adding basal lantus insulin 24 units daily. 2.) Modified correctional lispro insulin to very resistant dose. Assessment:  Patient is 45year old with past medical history including type 2 diabetes mellitus, hypertension, asthma, chronic diastolic heart failure, substance abuse, and depression - was admitted on 04/17/2018 with report of right sided weakness and pain arm/hand. Noted:  New left CVA. T2DM with current A1c of 8.3% (04/18/2018). Most recent blood glucose values:    Results for Barry Paredes (MRN [id number]) as of 4/19/2018 12:48   Ref. Range 4/18/2018 07:44 4/18/2018 11:47 4/18/2018 15:34 4/18/2018 21:46   GLUCOSE,FAST - POC Latest Ref Range: 70 - 110 mg/dL 162 (H) 144 (H) 279 (H) 165 (H)     Results for Barry Paredes (MRN [id number]) as of 4/19/2018 12:48   Ref. Range 4/19/2018 06:53 4/19/2018 07:26 4/19/2018 11:34   GLUCOSE,FAST - POC Latest Ref Range: 70 - 110 mg/dL 210 (H) 206 (H) 151 (H)     Current A1C: 8.3% (04/18/2018) which is equivalent to average blood glucose of 192 mg/dL during the past 2-3 months. Current hospital diabetes medications:  Correctional lispro insulin ACHS. Very resistant dose. Total daily dose insulin requirement previous day: 04/18/2018  Lispro: 10 units    Home diabetes medications: Patient reported on 04/19/2018:  Novolin 70/30 (pen) insulin: 22 units twice daily.   Metformin 500 mg twice daily.    Diet: Diabetic consistent carb regular; 2 Gm NA. Goals:  Blood glucose will be within target range of  mg/dL by 04/22/2018.      Education:  _X__  Refer to Diabetes Education Record: 04/19/2018.             ___  Education not indicated at this time    Alanis Moreau RN [facility]  Pager: [phone]

## 2018-04-19 NOTE — PROGRESS NOTES
ARU/IPR REFERRAL CONTACT NOTE  [address] [place] [address] for Physical Rehabilitation    RE: Vaughn Prince     Thank you for the opportunity to review this patient's case for admission to [address] [place] [address] for Physical Rehabilitation. Based on our pre-admission screening:     [x ] This patient does not meet criteria for admission to [facility] for Physical  Rehabilitation due to:    [x ] Too functional, per documentation, patient does not require both Physical and Occupational Therapy Services at an acute rehabilitation level of intensity. Again, Thank you for this referral. Should you have any questions please do not hesitate to call. Sincerely,  Madeleine Patel. León Grimaldo, [address]   León Grimaldo, RN  Admissions [facility] for Physical Rehabilitation  [phone]

## 2018-04-19 NOTE — DISCHARGE INSTRUCTIONS
Patient armband removed and shredded       High Blood Pressure: Care Instructions  Your Care Instructions    If your blood pressure is usually above 140/90, you have high blood pressure, or hypertension. That means the top number is 140 or higher or the bottom number is 90 or higher, or both. Despite what a lot of people think, high blood pressure usually doesn't cause headaches or make you feel dizzy or lightheaded. It usually has no symptoms. But it does increase your risk for heart attack, stroke, and kidney or eye damage. The higher your blood pressure, the more your risk increases. Your doctor will give you a goal for your blood pressure. Your goal will be based on your health and your age. An example of a goal is to keep your blood pressure below 140/90. Lifestyle changes, such as eating healthy and being active, are always important to help lower blood pressure. You might also take medicine to reach your blood pressure goal.  Follow-up care is a key part of your treatment and safety. Be sure to make and go to all appointments, and call your doctor if you are having problems. It's also a good idea to know your test results and keep a list of the medicines you take. How can you care for yourself at home? Medical treatment  · If you stop taking your medicine, your blood pressure will go back up. You may take one or more types of medicine to lower your blood pressure. Be safe with medicines. Take your medicine exactly as prescribed. Call your doctor if you think you are having a problem with your medicine. · Talk to your doctor before you start taking aspirin every day. Aspirin can help certain people lower their risk of a heart attack or stroke. But taking aspirin isn't right for everyone, because it can cause serious bleeding. · See your doctor regularly. You may need to see the doctor more often at first or until your blood pressure comes down.   · If you are taking blood pressure medicine, talk to your doctor before you take decongestants or anti-inflammatory medicine, such as ibuprofen. Some of these medicines can raise blood pressure. · Learn how to check your blood pressure at home. Lifestyle changes  · Stay at a healthy weight. This is especially important if you put on weight around the waist. Losing even 10 pounds can help you lower your blood pressure. · If your doctor recommends it, get more exercise. Walking is a good choice. Bit by bit, increase the amount you walk every day. Try for at least 30 minutes on most days of the week. You also may want to swim, bike, or do other activities. · Avoid or limit alcohol. Talk to your doctor about whether you can drink any alcohol. · Try to limit how much sodium you eat to less than 2,300 milligrams (mg) a day. Your doctor may ask you to try to eat less than 1,500 mg a day. · Eat plenty of fruits (such as bananas and oranges), vegetables, legumes, whole grains, and low-fat dairy products. · Lower the amount of saturated fat in your diet. Saturated fat is found in animal products such as milk, cheese, and meat. Limiting these foods may help you lose weight and also lower your risk for heart disease. · Do not smoke. Smoking increases your risk for heart attack and stroke. If you need help quitting, talk to your doctor about stop-smoking programs and medicines. These can increase your chances of quitting for good. When should you call for help? Call 911 anytime you think you may need emergency care. This may mean having symptoms that suggest that your blood pressure is causing a serious heart or blood vessel problem. Your blood pressure may be over 180/110. ? For example, call 911 if:  ? · You have symptoms of a heart attack. These may include:  ¨ Chest pain or pressure, or a strange feeling in the chest.  ¨ Sweating. ¨ Shortness of breath. ¨ Nausea or vomiting.   ¨ Pain, pressure, or a strange feeling in the back, neck, jaw, or upper belly or in one or both shoulders or arms. ¨ Lightheadedness or sudden weakness. ¨ A fast or irregular heartbeat. ? · You have symptoms of a stroke. These may include:  ¨ Sudden numbness, tingling, weakness, or loss of movement in your face, arm, or leg, especially on only one side of your body. ¨ Sudden vision changes. ¨ Sudden trouble speaking. ¨ Sudden confusion or trouble understanding simple statements. ¨ Sudden problems with walking or balance. ¨ A sudden, severe headache that is different from past headaches. ? · You have severe back or belly pain. ?Do not wait until your blood pressure comes down on its own. Get help right away. ?Call your doctor now or seek immediate care if:  ? · Your blood pressure is much higher than normal (such as 180/110 or higher), but you don't have symptoms. ? · You think high blood pressure is causing symptoms, such as:  ¨ Severe headache. ¨ Blurry vision. ? Watch closely for changes in your health, and be sure to contact your doctor if:  ? · Your blood pressure measures 140/90 or higher at least 2 times. That means the top number is 140 or higher or the bottom number is 90 or higher, or both. ? · You think you may be having side effects from your blood pressure medicine. ? · Your blood pressure is usually normal, but it goes above normal at least 2 times. Where can you learn more? Go to http://rashawn-bre.info/. Enter O600 in the search box to learn more about \"High Blood Pressure: Care Instructions. \"  Current as of: September 21, 2016  Content Version: 11.4  © 1310-5071 [employer]. Care instructions adapted under license by [employer] (which disclaims liability or warranty for this information). If you have questions about a medical condition or this instruction, always ask your healthcare professional. [place] [address] any warranty or liability for your use of this information.          Learning About an Ischemic Stroke  What is an ischemic stroke? An ischemic (say \"nbw-BYP-hfbq\") stroke occurs when a blood clot blocks a blood vessel in the brain. This means that blood cannot flow to some part of the brain. Without blood and the oxygen it carries, this part of the brain starts to die. The part of the body controlled by the damaged area of the brain cannot work properly. This is different from a hemorrhagic (say \"tkx-ccj-ZO-aniack\") stroke, which happens when a blood vessel in the brain has burst open or has started to leak. The brain damage from a stroke starts within minutes. Quick treatment can help limit damage to the brain and make recovery more likely. People who have had a stroke may have a hard time talking, understanding things, and making decisions. They may have to relearn daily activities, such as how to eat, bathe, and dress. How well someone recovers from a stroke depends on how quickly the person gets to the hospital, where in the brain the stroke happened, and how severe it was. Training and therapy also make a difference in how well people recover. What are the symptoms? If you have any of these symptoms, call 911 or other emergency services right away:  · You have symptoms of a stroke. These may include:  ¨ Sudden numbness, tingling, weakness, or loss of movement in your face, arm, or leg, especially on only one side of your body. ¨ Sudden vision changes. ¨ Sudden trouble speaking. ¨ Sudden confusion or trouble understanding simple statements. ¨ Sudden problems with walking or balance. ¨ A sudden, severe headache that is different from past headaches. See your doctor if you have symptoms that seem like a stroke, even if they go away quickly. You may have had a transient ischemic attack (TIA), sometimes called a mini-stroke. A TIA is a warning that a stroke may happen soon. Getting early treatment for a TIA can help prevent a stroke. What causes an ischemic stroke?   An ischemic stroke is caused by a blood clot that blocks blood flow in the brain. The most common causes of blood clots include:  · Hardening of the arteries (atherosclerosis). This is caused by high blood pressure, diabetes, high cholesterol, or smoking. · Atrial fibrillation. How is ischemic stroke treated? You may have to take several medicines, depending on what caused your stroke. Ask your doctor if a stroke rehab program is right for you. Rehab increases your chances of getting back some of the abilities you lost.  How can you prevent another stroke? · Work with your doctor to treat any health problems you have. High blood pressure, high cholesterol, atrial fibrillation, and diabetes all raise your chances of having a stroke. · Be safe with medicines. Take your medicine exactly as prescribed. Call your doctor if you think you are having a problem with your medicine. · Have a healthy lifestyle. ¨ Do not smoke or allow others to smoke around you. If you need help quitting, talk to your doctor about stop-smoking programs and medicines. These can increase your chances of quitting for good. Smoking makes a stroke more likely. ¨ Limit alcohol to 2 drinks a day for men and 1 drink a day for women. ¨ Lose weight if you need to. A healthy weight will help you keep your heart and body healthy. ¨ Be active. Ask your doctor what type and level of activity is safe for you. ¨ Eat heart-healthy foods, like fruits, vegetables, and high-fiber foods. Follow-up care is a key part of your treatment and safety. Be sure to make and go to all appointments, and call your doctor if you are having problems. It's also a good idea to know your test results and keep a list of the medicines you take. Where can you learn more? Go to http://rashawn-bre.info/. Enter D161 in the search box to learn more about \"Learning About an Ischemic Stroke. \"  Current as of: March 20, 2017  Content Version: 11.4  © 6694-7691 [employer]. Care instructions adapted under license by [employer] (which disclaims liability or warranty for this information). If you have questions about a medical condition or this instruction, always ask your healthcare professional. [address] any warranty or liability for your use of this information. Low Sodium Diet (2,000 Milligram): Care Instructions  Your Care Instructions    Too much sodium causes your body to hold on to extra water. This can raise your blood pressure and force your heart and kidneys to work harder. In very serious cases, this could cause you to be put in the hospital. It might even be life-threatening. By limiting sodium, you will feel better and lower your risk of serious problems. The most common source of sodium is salt. People get most of the salt in their diet from canned, prepared, and packaged foods. Fast food and restaurant meals also are very high in sodium. Your doctor will probably limit your sodium to less than 2,000 milligrams (mg) a day. This limit counts all the sodium in prepared and packaged foods and any salt you add to your food. Follow-up care is a key part of your treatment and safety. Be sure to make and go to all appointments, and call your doctor if you are having problems. It's also a good idea to know your test results and keep a list of the medicines you take. How can you care for yourself at home? Read food labels  · Read labels on cans and food packages. The labels tell you how much sodium is in each serving. Make sure that you look at the serving size. If you eat more than the serving size, you have eaten more sodium. · Food labels also tell you the Percent Daily Value for sodium. Choose products with low Percent Daily Values for sodium. · Be aware that sodium can come in forms other than salt, including monosodium glutamate (MSG), sodium citrate, and sodium bicarbonate (baking soda).  MSG is often added to Asian food. When you eat out, you can sometimes ask for food without MSG or added salt. Buy low-sodium foods  · Buy foods that are labeled \"unsalted\" (no salt added), \"sodium-free\" (less than 5 mg of sodium per serving), or \"low-sodium\" (less than 140 mg of sodium per serving). Foods labeled \"reduced-sodium\" and \"light sodium\" may still have too much sodium. Be sure to read the label to see how much sodium you are getting. · Buy fresh vegetables, or frozen vegetables without added sauces. Buy low-sodium versions of canned vegetables, soups, and other canned goods. Prepare low-sodium meals  · Cut back on the amount of salt you use in cooking. This will help you adjust to the taste. Do not add salt after cooking. One teaspoon of salt has about 2,300 mg of sodium. · Take the salt shaker off the table. · Flavor your food with garlic, lemon juice, onion, vinegar, herbs, and spices. Do not use soy sauce, lite soy sauce, steak sauce, onion salt, garlic salt, celery salt, mustard, or ketchup on your food. · Use low-sodium salad dressings, sauces, and ketchup. Or make your own salad dressings and sauces without adding salt. · Use less salt (or none) when recipes call for it. You can often use half the salt a recipe calls for without losing flavor. Other foods such as rice, pasta, and grains do not need added salt. · Rinse canned vegetables, and cook them in fresh water. This removes some-but not all-of the salt. · Avoid water that is naturally high in sodium or that has been treated with water softeners, which add sodium. Call your local water company to find out the sodium content of your water supply. If you buy bottled water, read the label and choose a sodium-free brand. Avoid high-sodium foods  · Avoid eating:  ¨ Smoked, cured, salted, and canned meat, fish, and poultry. ¨ Ham, ramirez, hot dogs, and luncheon meats. ¨ Regular, hard, and processed cheese and regular peanut butter.   ¨ Crackers with salted tops, and other salted snack foods such as pretzels, chips, and salted popcorn. ¨ Frozen prepared meals, unless labeled low-sodium. ¨ Canned and dried soups, broths, and bouillon, unless labeled sodium-free or low-sodium. ¨ Canned vegetables, unless labeled sodium-free or low-sodium. ¨ [employer] fries, pizza, tacos, and other fast foods. ¨ Pickles, olives, ketchup, and other condiments, especially soy sauce, unless labeled sodium-free or low-sodium. Where can you learn more? Go to http://rashawn-bre.info/. Enter D929 in the search box to learn more about \"Low Sodium Diet (2,000 Milligram): Care Instructions. \"  Current as of: May 12, 2017  Content Version: 11.4  © 2570-6069 [employer]. Care instructions adapted under license by [employer] (which disclaims liability or warranty for this information). If you have questions about a medical condition or this instruction, always ask your healthcare professional. [place] [address] any warranty or liability for your use of this information. [employer] Activation    Thank you for requesting access to [employer]. Please follow the instructions below to securely access and download your online medical record. [employer] allows you to send messages to your doctor, view your test results, renew your prescriptions, schedule appointments, and more. How Do I Sign Up? 1. In your internet browser, go to [email or web address]  2. Click on the First Time User? Click Here link in the Sign In box. You will be redirect to the New Member Sign Up page. 3. Enter your [employer] Access Code exactly as it appears below. You will not need to use this code after youve completed the sign-up process. If you do not sign up before the expiration date, you must request a new code. [employer] Access Code: [email or web address]  Expires: 2018  8:40 AM (This is the date your [employer] access code will )    4.  Enter the last four digits of your Social Security Number (xxxx) and Date of Birth (mm/dd/yyyy) as indicated and click Submit. You will be taken to the next sign-up page. 5. Create a [employer] ID. This will be your [employer] login ID and cannot be changed, so think of one that is secure and easy to remember. 6. Create a [employer] password. You can change your password at any time. 7. Enter your Password Reset Question and Answer. This can be used at a later time if you forget your password. 8. Enter your e-mail address. You will receive e-mail notification when new information is available in [address] 9. Click Sign Up. You can now view and download portions of your medical record. 10. Click the Download Summary menu link to download a portable copy of your medical information. Additional Information    If you have questions, please visit the Frequently Asked Questions section of the [employer] website at [email or web address] [email or web address] Remember, [employer] is NOT to be used for urgent needs. For medical emergencies, dial 911. DISCHARGE SUMMARY from Nurse    PATIENT INSTRUCTIONS:    After general anesthesia or intravenous sedation, for 24 hours or while taking prescription Narcotics:  · Limit your activities  · Do not drive and operate hazardous machinery  · Do not make important personal or business decisions  · Do  not drink alcoholic beverages  · If you have not urinated within 8 hours after discharge, please contact your surgeon on call.     Report the following to your surgeon:  · Excessive pain, swelling, redness or odor of or around the surgical area  · Temperature over 100.5  · Nausea and vomiting lasting longer than 4 hours or if unable to take medications  · Any signs of decreased circulation or nerve impairment to extremity: change in color, persistent  numbness, tingling, coldness or increase pain  · Any questions    What to do at Home:  Recommended activity: Activity as tolerated,     If you experience any of the following symptoms of facial droop, balance issues, blurred vision, slurred speech, or weakness, please follow up with PCP or 911. *  Please give a list of your current medications to your Primary Care Provider. *  Please update this list whenever your medications are discontinued, doses are      changed, or new medications (including over-the-counter products) are added. *  Please carry medication information at all times in case of emergency situations. These are general instructions for a healthy lifestyle:    No smoking/ No tobacco products/ Avoid exposure to second hand smoke  Surgeon General's Warning:  Quitting smoking now greatly reduces serious risk to your health. Obesity, smoking, and sedentary lifestyle greatly increases your risk for illness    A healthy diet, regular physical exercise & weight monitoring are important for maintaining a healthy lifestyle    You may be retaining fluid if you have a history of heart failure or if you experience any of the following symptoms:  Weight gain of 3 pounds or more overnight or 5 pounds in a week, increased swelling in our hands or feet or shortness of breath while lying flat in bed. Please call your doctor as soon as you notice any of these symptoms; do not wait until your next office visit. Recognize signs and symptoms of STROKE:    F-face looks uneven    A-arms unable to move or move unevenly    S-speech slurred or non-existent    T-time-call 911 as soon as signs and symptoms begin-DO NOT go       Back to bed or wait to see if you get better-TIME IS BRAIN. Warning Signs of HEART ATTACK     Call 911 if you have these symptoms:   Chest discomfort. Most heart attacks involve discomfort in the center of the chest that lasts more than a few minutes, or that goes away and comes back. It can feel like uncomfortable pressure, squeezing, fullness, or pain.  Discomfort in other areas of the upper body.  Symptoms can include pain or discomfort in one or both arms, the back, neck, jaw, or stomach.  Shortness of breath with or without chest discomfort.  Other signs may include breaking out in a cold sweat, nausea, or lightheadedness. Don't wait more than five minutes to call 911 - MINUTES MATTER! Fast action can save your life. Calling 911 is almost always the fastest way to get lifesaving treatment. Emergency Medical Services staff can begin treatment when they arrive -- up to an hour sooner than if someone gets to the hospital by car. The discharge information has been reviewed with the patient. The patient verbalized understanding. Discharge medications reviewed with the patient and appropriate educational materials and side effects teaching were provided.   ___________________________________________________________________________________________________________________________________

## 2018-04-19 NOTE — PROGRESS NOTES
Re:  Mary Garcia up visit     4/19/2018 1:47 PM    SSN: [id number]    Subjective: Gabe Ogden is seen in follow up. Her daughter is present. She feels back to baseline.       Medications:    Current Facility-Administered Medications   Medication Dose Route Frequency Provider Last Rate Last Dose    olmesartan (BENICAR) tablet 40 mg  40 mg Oral DAILY Howard Blunt MD   40 mg at 04/19/18 1148    carvedilol (COREG) tablet 6.25 mg  6.25 mg Oral Q12H Howard Blunt MD   6.25 mg at 04/19/18 1148    albuterol (PROVENTIL VENTOLIN) nebulizer solution 5 mg  5 mg Nebulization Q6H PRN Kamilla Wayne MD        sodium chloride (NS) flush 5-10 mL  5-10 mL IntraVENous Q8H Kamilla Wayne MD   10 mL at 04/19/18 0522    sodium chloride (NS) flush 5-10 mL  5-10 mL IntraVENous PRN Kamilla Wayne MD        aspirin (ASPIRIN) tablet 325 mg  325 mg Oral DAILY Kamilla Wayne MD   325 mg at 04/19/18 0920    ondansetron (ZOFRAN) injection 4 mg  4 mg IntraVENous Q6H PRN Kamilla Wayne MD        atorvastatin (LIPITOR) tablet 80 mg  80 mg Oral QHS Kamilla Wayne MD   80 mg at 04/18/18 2143    acetaminophen (TYLENOL) tablet 650 mg  650 mg Oral Q4H PRN Kamilla Wayne MD   650 mg at 04/18/18 0802    acetaminophen (TYLENOL) suppository 650 mg  650 mg Rectal Q4H PRN Kamilla Wayne MD        docusate sodium (COLACE) capsule 100 mg  100 mg Oral BID Kamilla Wayne MD   100 mg at 04/19/18 0920    famotidine (PEPCID) tablet 20 mg  20 mg Oral Q12H Kamilla Wayne MD   20 mg at 04/19/18 0920    heparin (porcine) injection 5,000 Units  5,000 Units SubCUTAneous Q8H Kamilla Wayne MD   5,000 Units at 04/19/18 0740    labetalol (NORMODYNE;TRANDATE) 20 mg/4 mL (5 mg/mL) injection 5 mg  5 mg IntraVENous Q10MIN PRN Kamilla Wayne MD        insulin lispro (HUMALOG) injection   SubCUTAneous AC&HS Howard Blunt MD   3 Units at 04/19/18 2939    glucose chewable tablet 16 g  4 Tab Oral PRN Fer Casey MD        glucagon (GLUCAGEN) injection 1 mg  1 mg IntraMUSCular PRN Fer Casey MD        dextrose (D50W) injection syrg 12.5-25 g  25-50 mL IntraVENous PRN Fer Casey MD           Vital signs:    Visit Vitals    BP (!) 169/105 (BP 1 Location: Left arm, BP Patient Position: At rest)    Pulse 76    Temp 98.1 °F (36.7 °C)    Resp 18    Ht 5' 2\" (1.575 m)    Wt 91.3 kg (201 lb 3.2 oz)    SpO2 98%    BMI 36.8 kg/m2       Review of Systems:   As above otherwise 11 point review of systems negative including;   Constitutional no fever or chills  Skin denies rash or itching  HEENT  Denies tinnitus, hearing lose  Eyes denies diplopia vision lose  Respiratory denies sortness of breath  Cardiovascular denies chest pain, dyspnea on exertion  Gastrointestinal denies nausea, vomiting, diarrhea, constipation  Genitourinary denies incontinence  Musculoskeletal denies joint pain or swelling  Endocrine denies weight change  Hematology denies easy bruising or bleeding   Neurological as above in HPI      Patient Active Problem List   Diagnosis Code    Blood loss anemia D50.0    Hypernatremia E87.0    Alcohol intoxication (HCC) F10.929    Asthma J45.909    Syncope and collapse R55    Menorrhagia N92.0    Fibroid D25.9    Chronic diastolic heart failure (HCC) I50.32    Essential hypertension, benign I10    Other and unspecified hyperlipidemia E78.5    Chest pain, unspecified R07.9    Shortness of breath R06.02    Midline low back pain M54.5    Encounter for long-term (current) use of medications Z79.899    Chronic pain syndrome G89.4    Spondylosis of lumbar region without myelopathy or radiculopathy M47.816    History of substance abuse Z87.898    History of anxiety Z86.59    History of depression Z86.59    CVA (cerebral vascular accident) ([facility] [address] I63.9    Right sided weakness R53.1    Essential hypertension I10    Type 2 diabetes mellitus with hyperglycemia ([facility] E11.65         Objective: The patient is awake, alert, and oriented x 4. Fund of knowledge is adequate. Speech is fluent and memory is intact. Cranial Nerves: II  Visual fields are full to confrontation. III, IV, VI  Extraocular movements are intact. There is no nystagmus. V  Facial sensation is intact to pinprick. VII  Face is symmetrical.  VIII - Hearing is present. IX, X, XII  Palate is symmetrical.   XI - Shoulder shrugging and head turning intact  Motor: The patient moves all four limbs fairly well and symmetrically. Tone is normal. Reflexes are 2+ and symmetrical. Plantars are down going. Gait is normal.    Final result (Exam End: 4/18/2018  5:13 PM) Open    Study Result   CTA OF  ARTERIES OF NECK AND HEAD:  --------------------------------------------------------------        Indication:     Suspected acute CVA.  Follow-up evaluation after CODE S.     History of hypercholesterolemia, chronic diastolic heart failure, hypertension,  COPD, and asthma.        TECHNIQUE:     Following intravenous administration of  [50 ml of] [Isovue-370] contrast,  dynamic[ 1.25] mm axial sections from the level of aortic arch up to level of  vertex been obtained.     Axial images are reformatted with slice thickness of 9.882 mm.     3-D MIP coronal and sagittal and axial images reformatted.     3-D images with surface rendering reformatted and displayed with rotating  frames.     Multilevel multiplanar CPR images reformatted.     All CT scans at this facility are performed using dose optimization technique as  appropriate to a  performed  examination, to include automated exposer control,  adjustment mA and / or  KV according to patient size (including appropriate  matching  for site specific examination), or use  of iterative  reconstruction  technique.      COMPARISON STUDY:[ None.]     --------------------------------------------------------------     FINDINGS:     ---------------------------------------------------------------     CTA of NECK     In the visualized upper portions of upper lobes of both lungs there is no  definable acute processes or focal lesion.      [No evidence] of dissection involving carotid arterial systems or vertebral  arteries of both sides. [No evidence] of extravasation of contrast into soft tissues of neck.     Right common carotid artery: [No evidence] of stenosis. [ Deepti Back calcified plaque     Right common carotid bifurcation:[ No ]calcified plaques. [ Deepti Back stenosis.     Right internal carotid artery:[ No evidence of stenosis. ]. [ Deepti Back calcified  plaques. Mild to moderate [tortuous] right ICA.     Right external carotid artery: Normally patent.     Right vertebral artery: [normally patent.] Right vertebral artery is smaller in  size than the left vertebral artery.     ----------------------------------------------     Left common carotid artery:[ No stenosis]. [ No significant plaque].    Left common carotid bifurcation: [No] stenosis. [ No significant ]plaque.     Left internal carotid artery:[ No] stenosis. [ Deepti Back calcified plaques. [Mild-to-moderate tortuous left ICA. Left external carotid artery:[ Normally  patent].    Left vertebral artery:[ Normally patent].    Any abnormalities in soft tissues of neck :[No obvious diagnostic finding.     Cervical spine: Moderate to marked spondylosis in lower cervical spine.     -------------------------------------------------------------------     CTA of cerebral arteries:     ------------------------------------------------------------------  Right ICA:  The petrous, cavernous, paraclinoid and supraclinoid segments of right internal  carotid artery:[ Patent. Minimal Calcified plaques in cavernous segments. No  significant stenosis].         Left ICA:  The petrous, cavernous, paraclinoid and supraclinoid segments of left internal  carotid artery:[ Patent. Minimal Calcified plaques in cavernous segments. No  significant stenosis].          Right anterior cerebral artery and its major  branches:[ Normally patent].     Right middle cerebral artery and its major  branches : [normally patent].    Left anterior cerebral artery at its major  branches : [ normally patent].    Left middle cerebral artery and its major  branches :[ normally patent]. There are bilateral patent and moderately prominent posterior communicating  arteries.     Anterior communicating artery is patent.        -----------------------------------------     Posterior circulation:     Intracranial portions of vertebral arteries of both sides:[ Normally patent].     Basilar artery: [Tortuous]. [ Patent without stenosis. ]. But, the basilar artery  appears to be mild to moderately hypoplastic.]     Posterior inferior cerebellar arteries of both sides :[ normally patent].    Anterior inferior cerebellar arteries of both sides:[Patent but faintly  visualized.     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 Patent right posterior comminuting  artery providing collateral circulation. Rest of right PCA and their major  branches are normally patent.     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 Patent left posterior communicating  artery. Rest of left PCA and their major branches are normally patent.     All major dural sinuses:[ Opacified without dural  sinus thrombosis. ]     Any vascular abnormality :[ none evident]     Any cerebral aneurysm:[ None demonstrated. ]     Any vascular malformation:[ None demonstrated. ]           Any demonstrable parenchymal abnormality in brain:[ None demonstrated. ]     Subdural or epidural hematomas:[ None demonstrated. ]     Any demonstrable subarachnoid hemorrhages:[ None evident. But subtle SAH may not  be seen on CTA study. ]     Paranasal sinuses:[ Negative.]     Orbits: [ negative]     Skull and base of skull: Negative     ---------------------------------     IMPRESSIONs:     No evidence of dissection or stenosis in the common carotid arteries, internal  carotid arteries and vertebral arteries of both sides.     No evidence of compromise in the intracranial anterior circulation of bilateral  ICAs.     No evidence of compromise  in vertebral- basilar arterial circulation. I have reviewed the above imagines myself. CBC:   Lab Results   Component Value Date/Time    WBC 4.1 (L) 04/18/2018 03:30 AM    RBC 4.60 04/18/2018 03:30 AM    HGB 14.2 04/18/2018 03:30 AM    HCT 41.2 04/18/2018 03:30 AM    PLATELET 430 30/35/8260 03:30 AM     BMP:   Lab Results   Component Value Date/Time    Glucose 182 (H) 04/17/2018 09:24 AM    Sodium 141 04/17/2018 09:24 AM    Potassium 4.1 04/17/2018 09:24 AM    Chloride 105 04/17/2018 09:24 AM    CO2 29 04/17/2018 09:24 AM    BUN 14 04/17/2018 09:24 AM    Creatinine 0.82 04/17/2018 09:24 AM    Calcium 9.6 04/17/2018 09:24 AM     CMP:   Lab Results   Component Value Date/Time    Glucose 182 (H) 04/17/2018 09:24 AM    Sodium 141 04/17/2018 09:24 AM    Potassium 4.1 04/17/2018 09:24 AM    Chloride 105 04/17/2018 09:24 AM    CO2 29 04/17/2018 09:24 AM    BUN 14 04/17/2018 09:24 AM    Creatinine 0.82 04/17/2018 09:24 AM    Calcium 9.6 04/17/2018 09:24 AM    Anion gap 7 04/17/2018 09:24 AM    BUN/Creatinine ratio 17 04/17/2018 09:24 AM    Alk.  phosphatase 127 (H) 11/19/2014 09:40 AM    Protein, total 7.9 11/19/2014 09:40 AM    Albumin 3.4 11/19/2014 09:40 AM    Globulin 4.5 (H) 11/19/2014 09:40 AM    A-G Ratio 0.8 11/19/2014 09:40 AM     Coagulation:   Lab Results   Component Value Date/Time    Prothrombin time 12.1 04/17/2018 09:24 AM    INR 0.9 04/17/2018 09:24 AM     Cardiac markers: Lab Results   Component Value Date/Time     03/22/2017 09:37 PM    CK-MB Index Cannot be calulated 03/22/2017 09:37 PM       Assessment:  TIA like syndrome in this patient who has risk factors including hypertension, diabetes, tobacco abuse. She's clinically stable at this time. Plan:  OK for discharge on ASA and lipid therapy. Needs good control of blood pressure and diabetes. Will see as outpatient. Sincerely,        Blair Ríos.  Florence Castillo M.D.

## 2018-04-19 NOTE — PROGRESS NOTES
Problem: Self Care Deficits Care Plan (Adult)  Goal: *Acute Goals and Plan of Care (Insert Text)  Outcome: Progressing Towards Goal  Occupational Therapy EVALUATION/discharge    Patient: Amie Cui (45 y.o. female)  Date: 4/19/2018  Primary Diagnosis: CVA (cerebral vascular accident) [facility])        Precautions: falls      ASSESSMENT AND RECOMMENDATIONS:  Based on the objective data described below, the patient presents with baseline level ADL. Pt cleared for therapy by RN and BP noted to be 159/129. Pt verbalizes she has no questions/concerns. Skilled occupational therapy is not indicated at this time. Discharge Recommendations: None  Further Equipment Recommendations for Discharge: N/A      Barriers to Learning/Limitations: None  Compensate with: visual, verbal, tactile, kinesthetic cues/model     COMPLEXITY     Eval Complexity: History: LOW Complexity : Brief history review ; Examination: LOW Complexity : 1-3 performance deficits relating to physical, cognitive , or psychosocial skils that result in activity limitations and / or participation restrictions ; Decision Making:LOW Complexity : No comorbidities that affect functional and no verbal or physical assistance needed to complete eval tasks  Assessment: low Complexity        G-CODES:     Self Care  Current  CI= 1-19%   Goal  CI= 1-19%   D/C  CI= 1-19%. The severity rating is based on the Level of Assistance required for Functional Mobility and ADLs. SUBJECTIVE:   Patient stated I can do all that.     OBJECTIVE DATA SUMMARY:     Past Medical History:   Diagnosis Date    Anemia     Anemia NEC     Arthritis     Asthma     Chest pain, unspecified     Possible GERD, normal NUC Stress test    Chronic diastolic heart failure (HCC)     Normal EF, DD    Essential hypertension, benign     Better controlled    Hypercholesteremia     Hypertension     Migraines     Other and unspecified hyperlipidemia     Shortness of breath Possible CHF, asthma, COPD    Thromboembolus ([facility] [address]      Past Surgical History:   Procedure Laterality Date    HX GYN      4 c-sections     Prior Level of Function/Home Situation: independent  Home Situation  Home Environment: Apartment  # Steps to Enter: 0  One/Two Story Residence: One story  Living Alone: No  Support Systems: Friends \ neighbors, [place] Cirri / [employer] community  Patient Expects to be Discharged to[de-identified] Apartment  Current DME Used/Available at Home: None  Tub or Shower Type: Shower  []     Right hand dominant   []     Left hand dominant  Cognitive/Behavioral Status:  Neurologic State: Alert  Orientation Level: Oriented X4          Skin: no skin concern    Edema: no edema noted    Vision/Perceptual:    Tracking:  (no noted concern)                                Coordination:  Coordination: Within functional limits (BUE)            Balance:  Sitting: Intact  Standing: Intact    Strength:    Strength: Within functional limits (BUE)                Tone & Sensation:    Tone: Normal (BUE)  Sensation: Intact (BUE)                      Range of Motion:    AROM: Within functional limits (BUE)                         Functional Mobility and Transfers for ADLs:  Bed Mobility:     Supine to Sit: Independent  Sit to Supine: Independent  Scooting: Independent  Transfers:  Sit to Stand: Modified independent                Toilet Transfer : Independent                ADL Assessment:  Feeding: Independent    Oral Facial Hygiene/Grooming: Independent    Bathing: Independent    Upper Body Dressing: Independent    Lower Body Dressing: Independent    Toileting: Independent          Pain:  Pt reports 0/10 pain or discomfort prior to treatment.    Pt reports 0/10 pain or discomfort post treatment. Activity Tolerance:   good    Please refer to the flowsheet for vital signs taken during this treatment.   After treatment:   []  Patient left in no apparent distress sitting up in chair  [x]  Patient left in no apparent distress in bed  [x]  Call bell left within reach  [x]  Nursing notified  []  Caregiver present  []  Bed alarm activated    COMMUNICATION/EDUCATION:   Communication/Collaboration:  []      Home safety education was provided and the patient/caregiver indicated understanding. [x]      Patient/family have participated as able and agree with findings and recommendations. []      Patient is unable to participate in plan of care at this time.     Gianni Jose, OT  Time Calculation: 9 mins

## 2018-04-22 ENCOUNTER — HOSPITAL ENCOUNTER (EMERGENCY)
Age: 53
Discharge: HOME | End: 2018-04-23
Attending: EMERGENCY MEDICINE | Admitting: EMERGENCY MEDICINE
Payer: SELF-PAY

## 2018-04-22 DIAGNOSIS — F10.929 ALCOHOLIC INTOXICATION WITH COMPLICATION (HCC): Primary | ICD-10-CM

## 2018-04-22 DIAGNOSIS — F14.90 COCAINE USE: ICD-10-CM

## 2018-04-22 LAB
ALBUMIN SERPL-MCNC: 3.6 G/DL (ref 3.4–5)
ALBUMIN/GLOB SERPL: 0.8 {RATIO} (ref 0.8–1.7)
ALP SERPL-CCNC: 128 U/L (ref 45–117)
ALT SERPL-CCNC: 29 U/L (ref 13–56)
AMPHET UR QL SCN: NEGATIVE
ANION GAP SERPL CALC-SCNC: 8 MMOL/L (ref 3–18)
APPEARANCE UR: CLEAR
AST SERPL-CCNC: 36 U/L (ref 15–37)
BARBITURATES UR QL SCN: NEGATIVE
BASOPHILS # BLD: 0 K/UL (ref 0–0.1)
BASOPHILS NFR BLD: 0 % (ref 0–2)
BENZODIAZ UR QL: NEGATIVE
BILIRUB SERPL-MCNC: 0.2 MG/DL (ref 0.2–1)
BILIRUB UR QL: NEGATIVE
BUN SERPL-MCNC: 11 MG/DL (ref 7–18)
BUN/CREAT SERPL: 12 (ref 12–20)
CALCIUM SERPL-MCNC: 10 MG/DL (ref 8.5–10.1)
CANNABINOIDS UR QL SCN: NEGATIVE
CHLORIDE SERPL-SCNC: 99 MMOL/L (ref 100–108)
CO2 SERPL-SCNC: 28 MMOL/L (ref 21–32)
COCAINE UR QL SCN: POSITIVE
COLOR UR: YELLOW
CREAT SERPL-MCNC: 0.89 MG/DL (ref 0.6–1.3)
DIFFERENTIAL METHOD BLD: NORMAL
EOSINOPHIL # BLD: 0.1 K/UL (ref 0–0.4)
EOSINOPHIL NFR BLD: 1 % (ref 0–5)
ERYTHROCYTE [DISTWIDTH] IN BLOOD BY AUTOMATED COUNT: 14 % (ref 11.6–14.5)
ETHANOL SERPL-MCNC: 385 MG/DL (ref 0–3)
GLOBULIN SER CALC-MCNC: 4.5 G/DL (ref 2–4)
GLUCOSE SERPL-MCNC: 237 MG/DL (ref 74–99)
GLUCOSE UR STRIP.AUTO-MCNC: NEGATIVE MG/DL
HCT VFR BLD AUTO: 42.8 % (ref 35–45)
HDSCOM,HDSCOM: ABNORMAL
HGB BLD-MCNC: 14.8 G/DL (ref 12–16)
HGB UR QL STRIP: NEGATIVE
INR PPP: 0.9 (ref 0.8–1.2)
KETONES UR QL STRIP.AUTO: NEGATIVE MG/DL
LEUKOCYTE ESTERASE UR QL STRIP.AUTO: NEGATIVE
LYMPHOCYTES # BLD: 3 K/UL (ref 0.9–3.6)
LYMPHOCYTES NFR BLD: 44 % (ref 21–52)
MCH RBC QN AUTO: 31 PG (ref 24–34)
MCHC RBC AUTO-ENTMCNC: 34.6 G/DL (ref 31–37)
MCV RBC AUTO: 89.7 FL (ref 74–97)
METHADONE UR QL: NEGATIVE
MONOCYTES # BLD: 0.4 K/UL (ref 0.05–1.2)
MONOCYTES NFR BLD: 6 % (ref 3–10)
NEUTS SEG # BLD: 3.4 K/UL (ref 1.8–8)
NEUTS SEG NFR BLD: 49 % (ref 40–73)
NITRITE UR QL STRIP.AUTO: NEGATIVE
OPIATES UR QL: NEGATIVE
PCP UR QL: NEGATIVE
PH UR STRIP: 5 [PH] (ref 5–8)
PLATELET # BLD AUTO: 247 K/UL (ref 135–420)
PMV BLD AUTO: 10.5 FL (ref 9.2–11.8)
POTASSIUM SERPL-SCNC: 4.6 MMOL/L (ref 3.5–5.5)
PROT SERPL-MCNC: 8.1 G/DL (ref 6.4–8.2)
PROT UR STRIP-MCNC: NEGATIVE MG/DL
PROTHROMBIN TIME: 12 SEC (ref 11.5–15.2)
RBC # BLD AUTO: 4.77 M/UL (ref 4.2–5.3)
SODIUM SERPL-SCNC: 135 MMOL/L (ref 136–145)
SP GR UR REFRACTOMETRY: 1.01 (ref 1–1.03)
UROBILINOGEN UR QL STRIP.AUTO: 0.2 EU/DL (ref 0.2–1)
WBC # BLD AUTO: 6.9 K/UL (ref 4.6–13.2)

## 2018-04-22 PROCEDURE — 80307 DRUG TEST PRSMV CHEM ANLYZR: CPT

## 2018-04-22 PROCEDURE — 80053 COMPREHEN METABOLIC PANEL: CPT

## 2018-04-22 PROCEDURE — 81003 URINALYSIS AUTO W/O SCOPE: CPT

## 2018-04-22 PROCEDURE — 99285 EMERGENCY DEPT VISIT HI MDM: CPT

## 2018-04-22 PROCEDURE — 74011250636 HC RX REV CODE- 250/636: Performed by: EMERGENCY MEDICINE

## 2018-04-22 PROCEDURE — 85025 COMPLETE CBC W/AUTO DIFF WBC: CPT

## 2018-04-22 PROCEDURE — 93005 ELECTROCARDIOGRAM TRACING: CPT

## 2018-04-22 PROCEDURE — 85610 PROTHROMBIN TIME: CPT

## 2018-04-22 PROCEDURE — 96360 HYDRATION IV INFUSION INIT: CPT

## 2018-04-22 RX ADMIN — SODIUM CHLORIDE 1000 ML: 900 INJECTION, SOLUTION INTRAVENOUS at 22:48

## 2018-04-22 RX ADMIN — SODIUM CHLORIDE 1000 ML: 900 INJECTION, SOLUTION INTRAVENOUS at 22:47

## 2018-04-23 VITALS
SYSTOLIC BLOOD PRESSURE: 102 MMHG | HEART RATE: 88 BPM | RESPIRATION RATE: 20 BRPM | DIASTOLIC BLOOD PRESSURE: 65 MMHG | TEMPERATURE: 98 F | OXYGEN SATURATION: 99 %

## 2018-04-23 LAB
ATRIAL RATE: 83 BPM
CALCULATED P AXIS, ECG09: 59 DEGREES
CALCULATED R AXIS, ECG10: -20 DEGREES
CALCULATED T AXIS, ECG11: 67 DEGREES
DIAGNOSIS, 93000: NORMAL
P-R INTERVAL, ECG05: 200 MS
Q-T INTERVAL, ECG07: 408 MS
QRS DURATION, ECG06: 88 MS
QTC CALCULATION (BEZET), ECG08: 479 MS
VENTRICULAR RATE, ECG03: 83 BPM

## 2018-04-23 NOTE — ED TRIAGE NOTES
Pt found by bystander who called EMS because pt was found laying in parking lot with half her clothes on per EMS. Pt is intoxicated and unable to ambulate due to such.

## 2018-04-23 NOTE — ED PROVIDER NOTES
EMERGENCY DEPARTMENT HISTORY AND PHYSICAL EXAM    10:54 PM      Date: 4/22/2018  Patient Name: Corey Hernández    History of Presenting Illness     Chief Complaint   Patient presents with    Alcohol intoxication         History Provided By: Patient    Chief Complaint: Intoxication  Duration:  2 hours  Timing:  constant  Location: Global  Quality: N/a  Severity: N/a  Modifying Factors: None  Associated Symptoms: denies any other associated signs or symptoms      Additional History (Context): Corey Hernández is a 45 y.o. female with PMHx significant for anemia, HTN, asthma, and arthritis who presents via ambulance with constant global intoxication for the past 2 hours. The pt was picked up by EMS and was so intoxicated she could not ambulate. Pt admitted to ingesting alcohol and has taken albuterol today. She denies other drug use. No other concerns, modifying factors, or symptoms were expressed by the pt at this time. PCP: PROVIDER UNKNOWN    Current Outpatient Prescriptions   Medication Sig Dispense Refill    aspirin (ASPIRIN) 325 mg tablet Take 1 Tab by mouth daily. 10 Tab 0    CYCLOBENZAPRINE HCL (CYCLOBENZAPRINE PO) Take  by mouth.  SIMVASTATIN PO Take  by mouth.  fluticasone-salmeterol (ADVAIR) 100-50 mcg/dose diskus inhaler Take 1 Puff by inhalation every twelve (12) hours.  insulin NPH/insulin regular (NOVOLIN 70/30, HUMULIN 70/30) 100 unit/mL (70-30) injection by SubCUTAneous route.  metFORMIN (GLUCOPHAGE) 500 mg tablet Take 1 Tab by mouth daily (with breakfast). 30 Tab 0    oxyCODONE-acetaminophen (PERCOCET) 5-325 mg per tablet Take 1 Tab by mouth every four (4) hours as needed for Pain. 8 Tab 0    Olmesartan-Amlodipine-HCTZ (TRIBENZOR) 40-5-12.5 mg Tab Take  by mouth.  lisinopril (PRINIVIL, ZESTRIL) 5 mg tablet Take  by mouth daily.  furosemide (LASIX) 40 mg tablet Take  by mouth daily.         ferrous sulf-fa-b comp with c (MULTIRET FOLIC) 835 (794)-9.4 mg TbER Take 1 Tab by mouth daily. 90 Tab 0    albuterol (PROVENTIL VENTOLIN) 2.5 mg /3 mL (0.083 %) nebulizer solution 5 mg by Nebulization route once. Past History     Past Medical History:  Past Medical History:   Diagnosis Date    Anemia     Anemia NEC     Arthritis     Asthma     Chest pain, unspecified     Possible GERD, normal NUC Stress test    Chronic diastolic heart failure (HCC)     Normal EF, DD    Essential hypertension, benign     Better controlled    Hypercholesteremia     Hypertension     Migraines     Other and unspecified hyperlipidemia     Shortness of breath     Possible CHF, asthma, COPD    Thromboembolus ([address]        Past Surgical History:  Past Surgical History:   Procedure Laterality Date    HX GYN      4 c-sections       Family History:  Family History   Problem Relation Age of Onset    Heart Disease Other      positive family history for heart disease    Diabetes Mother     Cancer Mother     Cancer Father     Diabetes Sister     Diabetes Paternal Grandmother        Social History:  Social History   Substance Use Topics    Smoking status: Current Some Day Smoker    Smokeless tobacco: Not on file    Alcohol use No       Allergies: Allergies   Allergen Reactions    Pcn [Penicillins] Hives and Swelling    Penicillins Hives         Review of Systems       Review of Systems   Constitutional: Negative for chills, fatigue and fever. Intoxicated   HENT: Negative for congestion, rhinorrhea and sore throat. Eyes: Negative for visual disturbance. Respiratory: Negative for cough, shortness of breath and wheezing. Cardiovascular: Negative for chest pain, palpitations and leg swelling. Gastrointestinal: Negative for abdominal pain, diarrhea, nausea and vomiting. Genitourinary: Negative for difficulty urinating and dysuria. Musculoskeletal: Negative for arthralgias. Skin: Negative for rash. Neurological: Negative for weakness and headaches.    All other systems reviewed and are negative. Physical Exam     Visit Vitals    /65    Pulse 88    Temp 98 °F (36.7 °C)    Resp 20    SpO2 99%         Physical Exam   Constitutional: She is oriented to person, place, and time. She appears well-developed and well-nourished. No distress. Pt smells of ETOH. HENT:   Head: Normocephalic and atraumatic. Mouth/Throat: Oropharynx is clear and moist and mucous membranes are normal. No oropharyngeal exudate. No obvious sighs of trauma on head. Eyes: Conjunctivae and EOM are normal. No scleral icterus. Pupils 5-6mm dilated and minimally reactive to light. Pupils are irregularly shaped (cataract surgery?)   Neck: Normal range of motion. Neck supple. No JVD present. No thyromegaly present. Cardiovascular: Normal rate, regular rhythm, S1 normal, S2 normal, normal heart sounds and intact distal pulses. Exam reveals no gallop and no friction rub. No murmur heard. Pulmonary/Chest: Effort normal and breath sounds normal. No accessory muscle usage. No respiratory distress. She has no wheezes. She has no rhonchi. She has no rales. She exhibits no tenderness. Abdominal: Soft. Normal appearance and bowel sounds are normal. She exhibits no distension and no pulsatile midline mass. There is no tenderness. There is no rebound and no guarding. Musculoskeletal: Normal range of motion. No cervical, thoracic, or lumbar spinal tenderness. No breaks of skin or signs of spinal trauma. Lymphadenopathy:        Head (right side): No submandibular adenopathy present. She has no cervical adenopathy. Neurological: She is alert and oriented to person, place, and time. Moving all extremities. No obvious deficits or facial asymmetry. Skin: Skin is warm, dry and intact. No rash noted. No erythema. Psychiatric: She has a normal mood and affect. Her speech is normal and behavior is normal.   Nursing note and vitals reviewed.         Diagnostic Study Results Labs -  Recent Results (from the past 12 hour(s))   CBC WITH AUTOMATED DIFF    Collection Time: 04/22/18 10:00 PM   Result Value Ref Range    WBC 6.9 4.6 - 13.2 K/uL    RBC 4.77 4.20 - 5.30 M/uL    HGB 14.8 12.0 - 16.0 g/dL    HCT 42.8 35.0 - 45.0 %    MCV 89.7 74.0 - 97.0 FL    MCH 31.0 24.0 - 34.0 PG    MCHC 34.6 31.0 - 37.0 g/dL    RDW 14.0 11.6 - 14.5 %    PLATELET 491 412 - 709 K/uL    MPV 10.5 9.2 - 11.8 FL    NEUTROPHILS 49 40 - 73 %    LYMPHOCYTES 44 21 - 52 %    MONOCYTES 6 3 - 10 %    EOSINOPHILS 1 0 - 5 %    BASOPHILS 0 0 - 2 %    ABS. NEUTROPHILS 3.4 1.8 - 8.0 K/UL    ABS. LYMPHOCYTES 3.0 0.9 - 3.6 K/UL    ABS. MONOCYTES 0.4 0.05 - 1.2 K/UL    ABS. EOSINOPHILS 0.1 0.0 - 0.4 K/UL    ABS. BASOPHILS 0.0 0.0 - 0.1 K/UL    DF AUTOMATED     METABOLIC PANEL, COMPREHENSIVE    Collection Time: 04/22/18 10:00 PM   Result Value Ref Range    Sodium 135 (L) 136 - 145 mmol/L    Potassium 4.6 3.5 - 5.5 mmol/L    Chloride 99 (L) 100 - 108 mmol/L    CO2 28 21 - 32 mmol/L    Anion gap 8 3.0 - 18 mmol/L    Glucose 237 (H) 74 - 99 mg/dL    BUN 11 7.0 - 18 MG/DL    Creatinine 0.89 0.6 - 1.3 MG/DL    BUN/Creatinine ratio 12 12 - 20      GFR est AA >60 >60 ml/min/1.73m2    GFR est non-AA >60 >60 ml/min/1.73m2    Calcium 10.0 8.5 - 10.1 MG/DL    Bilirubin, total 0.2 0.2 - 1.0 MG/DL    ALT (SGPT) 29 13 - 56 U/L    AST (SGOT) 36 15 - 37 U/L    Alk.  phosphatase 128 (H) 45 - 117 U/L    Protein, total 8.1 6.4 - 8.2 g/dL    Albumin 3.6 3.4 - 5.0 g/dL    Globulin 4.5 (H) 2.0 - 4.0 g/dL    A-G Ratio 0.8 0.8 - 1.7     PROTHROMBIN TIME + INR    Collection Time: 04/22/18 10:00 PM   Result Value Ref Range    Prothrombin time 12.0 11.5 - 15.2 sec    INR 0.9 0.8 - 1.2     ETHYL ALCOHOL    Collection Time: 04/22/18 10:00 PM   Result Value Ref Range    ALCOHOL(ETHYL),SERUM 385 (HH) 0 - 3 MG/DL   URINALYSIS W/ RFLX MICROSCOPIC    Collection Time: 04/22/18 10:34 PM   Result Value Ref Range    Color YELLOW      Appearance CLEAR      Specific gravity 1.009 1.005 - 1.030      pH (UA) 5.0 5.0 - 8.0      Protein NEGATIVE  NEG mg/dL    Glucose NEGATIVE  NEG mg/dL    Ketone NEGATIVE  NEG mg/dL    Bilirubin NEGATIVE  NEG      Blood NEGATIVE  NEG      Urobilinogen 0.2 0.2 - 1.0 EU/dL    Nitrites NEGATIVE  NEG      Leukocyte Esterase NEGATIVE  NEG     DRUG SCREEN, URINE    Collection Time: 04/22/18 10:34 PM   Result Value Ref Range    BENZODIAZEPINES NEGATIVE  NEG      BARBITURATES NEGATIVE  NEG      THC (TH-CANNABINOL) NEGATIVE  NEG      OPIATES NEGATIVE  NEG      PCP(PHENCYCLIDINE) NEGATIVE  NEG      COCAINE POSITIVE (A) NEG      AMPHETAMINES NEGATIVE  NEG      METHADONE NEGATIVE  NEG      HDSCOM (NOTE)    EKG, 12 LEAD, INITIAL    Collection Time: 04/22/18 10:52 PM   Result Value Ref Range    Ventricular Rate 83 BPM    Atrial Rate 83 BPM    P-R Interval 200 ms    QRS Duration 88 ms    Q-T Interval 408 ms    QTC Calculation (Bezet) 479 ms    Calculated P Axis 59 degrees    Calculated R Axis -20 degrees    Calculated T Axis 67 degrees    Diagnosis       Normal sinus rhythm  Prolonged QT  Abnormal ECG  When compared with ECG of 17-APR-2018 11:30,  No significant change was found         Radiologic Studies -   No results found. Medical Decision Making   I am the first provider for this patient. I reviewed the vital signs, available nursing notes, past medical history, past surgical history, family history and social history. Vital Signs-Reviewed the patient's vital signs. Pulse Oximetry Analysis - Nonhypoxic    EKG: Interpreted by the EP. Time Interpreted: 22:52   Rate: 83   Rhythm: NSR   Interpretation: normal axis, no ST elevation or T wave changes. No Q waves.  QTc is 479 and    was 481 on previous EKG     Comparison: 04/17/2018    Records Reviewed: Nursing Notes and Old Medical Records (Time of Review: 10:54 PM)      Provider Notes (Medical Decision Making):   ASSESSMENT / PLAN:       46 y/o AA woman, PMHx of HTN, Hyperlipidemia, COPD, CVA (recent admission,ischemic, no thrombolytics) who presents via EMS for intoxication. Found in parking lot smelling of ETOH. Police called EMS. Smelled heavily of ETOH, brought to ED. Can't givegreat history but reports drinking ETOH today and taking albuterol. Denies other ingestions    On exam, BP a bit soft ~66 systolic, HR 88',Q, rest of vital normal. Overweight, smells heavily ofETOH, slurring words, somnolent but arousable to sternal rub and follows commands and moves allextremiteis. Pupils irregular shaped and ~6mm/reactive bilat (?cataracts?). No signs of truam. HEENT, lung, cv, abd benign. Moves allextremieist on command, no facial deficits. Seems most likely ETOH intox but could be coingestion as well. BP on low side. Concerned for possible occult ifection or some other etiiology. -IV access  -CBC, CMP, Coags  -ETOH, UDS  -EKG  -2 liter NS IVF bolus  -No imaging now: nonfocal neuro exam, no blood thinners listed on chart  -Diane Johnston MD  EM-IM Physician    ED Course: Progress Notes, Reevaluation, and Consults:  11:50 PM Progress note: CBC normal. Urinalysis normal. CMP normal. Urine drug screen positive for cocaine. Alcohol level 385. BP has improved. Pt is still clinically intoxicated so I will continue to observe and allow her to metabolize to freedom. UPDATE 4:17 AM  -Labs benign except for ETOH intoxication and cocaine positive urine  -Pt slept in ED forseveal hours, then was able to ambulate to bathroom w/out issue. -DC home. Will call for ride  -Encouraged ETOH and cocaine cessation      Dada Khan MD  EM-IM Physician         Diagnosis     Clinical Impression: No diagnosis found. Disposition: Discharged    Follow-up Information     None           Patient's Medications   Start Taking    No medications on file   Continue Taking    ALBUTEROL (PROVENTIL VENTOLIN) 2.5 MG /3 ML (0.083 %) NEBULIZER SOLUTION    5 mg by Nebulization route once.       ASPIRIN (ASPIRIN) 325 MG TABLET    Take 1 Tab by mouth daily. CYCLOBENZAPRINE HCL (CYCLOBENZAPRINE PO)    Take  by mouth. FERROUS SULF-FA-B COMP WITH C (MULTIRET FOLIC) 276 (406)-0.1 MG TBER    Take 1 Tab by mouth daily. FLUTICASONE-SALMETEROL (ADVAIR) 100-50 MCG/DOSE DISKUS INHALER    Take 1 Puff by inhalation every twelve (12) hours. FUROSEMIDE (LASIX) 40 MG TABLET    Take  by mouth daily. INSULIN NPH/INSULIN REGULAR (NOVOLIN 70/30, HUMULIN 70/30) 100 UNIT/ML (70-30) INJECTION    by SubCUTAneous route. LISINOPRIL (PRINIVIL, ZESTRIL) 5 MG TABLET    Take  by mouth daily. METFORMIN (GLUCOPHAGE) 500 MG TABLET    Take 1 Tab by mouth daily (with breakfast). OLMESARTAN-AMLODIPINE-HCTZ (TRIBENZOR) 40-5-12.5 MG TAB    Take  by mouth. OXYCODONE-ACETAMINOPHEN (PERCOCET) 5-325 MG PER TABLET    Take 1 Tab by mouth every four (4) hours as needed for Pain. SIMVASTATIN PO    Take  by mouth. These Medications have changed    No medications on file   Stop Taking    No medications on file     _______________________________    Attestations:  Storm Gandara MD acting as a scribe for and in the presence of Yun Alcantar MD      April 22, 2018 at 10:54 PM       Provider Attestation:      I personally performed the services described in the documentation, reviewed the documentation, as recorded by the scribe in my presence, and it accurately and completely records my words and actions.  April 22, 2018 at 10:54 PM - Yun Alcantar MD    _______________________________

## 2018-04-23 NOTE — DISCHARGE INSTRUCTIONS
Alcohol and Drug Problems: Care Instructions  Your Care Instructions    You can improve your life and health by stopping your use of alcohol or drugs. Ending dependency on alcohol or drugs may help you feel and sleep better. You may get along better with your family, friends, and coworkers. There are medicines and programs that can help. Follow-up care is a key part of your treatment and safety. Be sure to make and go to all appointments, and call your doctor if you are having problems. It's also a good idea to know your test results and keep a list of the medicines you take. How can you care for yourself at home? · If you have been given medicine to help keep you sober or reduce your cravings, be sure to take it as prescribed. · Talk to your doctor about programs that can help you stop using drugs or drinking alcohol. · If your doctor prescribes disulfiram (Antabuse), do not drink any alcohol while you are taking this medicine. You may have severe, even life-threatening, side effects from even small amounts of alcohol. · Do not tempt yourself by keeping alcohol or drugs in your home. · Learn how to say no when other people drink or use drugs. Or don't spend time with people who drink or use drugs. · Use the time and money spent on drinking or drugs to do something fun with your family or friends. Preventing a relapse  · Do not drink alcohol or use drugs at all. Using any amount of alcohol or drugs greatly increases your risk for relapse. · Seek help from organizations such as Alcoholics Anonymous, Narcotics Anonymous, or treatment facilities if you feel the need to drink alcohol or use drugs again. · Remember that recovery is a lifelong process. · Stay away from situations, friends, or places that may lead you to drink or use drugs. · Have a plan to spot and deal with relapse. Learn to recognize changes in your thinking that lead you to drink or use drugs. These are warning signs.  Get help before you start to drink or use drugs again. · Get help as soon as you can if you relapse. Some people make a plan with another person that outlines what they want that person to do for them if they relapse. The plan usually includes how to handle the relapse and who to notify in case of relapse. · Don't give up. Remember that a relapse does not mean that you have failed. Use the experience to learn the triggers that lead you to drink or use drugs. Then quit again. Many people have several relapses before they are able to quit for good. When should you call for help? Call 911 anytime you think you may need emergency care. For example, call if:  ? · You feel you cannot stop from hurting yourself or someone else. ?Call your doctor now or seek immediate medical care if:  ? · You have serious withdrawal symptoms such as confusion, hallucinations, or severe trembling. ? Watch closely for changes in your health, and be sure to contact your doctor if:  ? · You have a relapse. ? · You need more help or support to stop. Where can you learn more? Go to http://rashawn-bre.info/. Enter [id number] in the search box to learn more about \"Alcohol and Drug Problems: Care Instructions. \"  Current as of: November 3, 2016  Content Version: 11.4  © 4563-3081 Healthwise[employer]. Care instructions adapted under license by [employer] (which disclaims liability or warranty for this information). If you have questions about a medical condition or this instruction, always ask your healthcare professional. [place] [address] any warranty or liability for your use of this information.

## 2018-05-01 NOTE — ED NOTES
The [employer] made several attempts to contact the client by phone and no response. Their mailed contact letter was returned to sender. The client was connected to the [employer] in 03-. The client file was closed due to being non compliant.

## 2020-08-29 ENCOUNTER — HOSPITAL ENCOUNTER (EMERGENCY)
Age: 55
Discharge: HOME | End: 2020-08-30
Attending: EMERGENCY MEDICINE | Admitting: EMERGENCY MEDICINE
Payer: MEDICAID

## 2020-08-29 DIAGNOSIS — N64.4 BREAST PAIN: Primary | ICD-10-CM

## 2020-08-29 DIAGNOSIS — R07.9 CHEST PAIN, UNSPECIFIED TYPE: ICD-10-CM

## 2020-08-29 PROCEDURE — 85730 THROMBOPLASTIN TIME PARTIAL: CPT

## 2020-08-29 PROCEDURE — 93005 ELECTROCARDIOGRAM TRACING: CPT

## 2020-08-29 PROCEDURE — 85025 COMPLETE CBC W/AUTO DIFF WBC: CPT

## 2020-08-29 PROCEDURE — 84484 ASSAY OF TROPONIN QUANT: CPT

## 2020-08-29 PROCEDURE — 99284 EMERGENCY DEPT VISIT MOD MDM: CPT

## 2020-08-29 PROCEDURE — 85610 PROTHROMBIN TIME: CPT

## 2020-08-29 PROCEDURE — 80053 COMPREHEN METABOLIC PANEL: CPT

## 2020-08-29 RX ORDER — MORPHINE SULFATE 2 MG/ML
2 INJECTION, SOLUTION INTRAMUSCULAR; INTRAVENOUS ONCE
Status: DISCONTINUED | OUTPATIENT
Start: 2020-08-29 | End: 2020-08-30 | Stop reason: HOSPADM

## 2020-08-30 ENCOUNTER — APPOINTMENT (OUTPATIENT)
Dept: CT IMAGING | Age: 55
End: 2020-08-30
Attending: EMERGENCY MEDICINE
Payer: MEDICAID

## 2020-08-30 VITALS
DIASTOLIC BLOOD PRESSURE: 66 MMHG | RESPIRATION RATE: 22 BRPM | TEMPERATURE: 98.6 F | HEART RATE: 97 BPM | OXYGEN SATURATION: 96 % | SYSTOLIC BLOOD PRESSURE: 106 MMHG

## 2020-08-30 LAB
ALBUMIN SERPL-MCNC: 3.4 G/DL (ref 3.4–5)
ALBUMIN/GLOB SERPL: 0.9 {RATIO} (ref 0.8–1.7)
ALP SERPL-CCNC: 115 U/L (ref 45–117)
ALT SERPL-CCNC: 20 U/L (ref 13–56)
ANION GAP SERPL CALC-SCNC: 8 MMOL/L (ref 3–18)
APTT PPP: 29.1 SEC (ref 23–36.4)
AST SERPL-CCNC: 19 U/L (ref 10–38)
ATRIAL RATE: 105 BPM
BASOPHILS # BLD: 0 K/UL (ref 0–0.1)
BASOPHILS NFR BLD: 0 % (ref 0–2)
BILIRUB SERPL-MCNC: 0.3 MG/DL (ref 0.2–1)
BUN SERPL-MCNC: 10 MG/DL (ref 7–18)
BUN/CREAT SERPL: 17 (ref 12–20)
CALCIUM SERPL-MCNC: 8.8 MG/DL (ref 8.5–10.1)
CALCULATED P AXIS, ECG09: 69 DEGREES
CALCULATED R AXIS, ECG10: -5 DEGREES
CALCULATED T AXIS, ECG11: 85 DEGREES
CHLORIDE SERPL-SCNC: 108 MMOL/L (ref 100–111)
CO2 SERPL-SCNC: 22 MMOL/L (ref 21–32)
CREAT SERPL-MCNC: 0.58 MG/DL (ref 0.6–1.3)
DIAGNOSIS, 93000: NORMAL
DIFFERENTIAL METHOD BLD: NORMAL
EOSINOPHIL # BLD: 0.1 K/UL (ref 0–0.4)
EOSINOPHIL NFR BLD: 1 % (ref 0–5)
ERYTHROCYTE [DISTWIDTH] IN BLOOD BY AUTOMATED COUNT: 12.3 % (ref 11.6–14.5)
GLOBULIN SER CALC-MCNC: 4 G/DL (ref 2–4)
GLUCOSE SERPL-MCNC: 254 MG/DL (ref 74–99)
HCT VFR BLD AUTO: 41.7 % (ref 35–45)
HGB BLD-MCNC: 14.1 G/DL (ref 12–16)
INR PPP: 1 (ref 0.8–1.2)
LYMPHOCYTES # BLD: 3.3 K/UL (ref 0.9–3.6)
LYMPHOCYTES NFR BLD: 44 % (ref 21–52)
MCH RBC QN AUTO: 31.3 PG (ref 24–34)
MCHC RBC AUTO-ENTMCNC: 33.8 G/DL (ref 31–37)
MCV RBC AUTO: 92.5 FL (ref 74–97)
MONOCYTES # BLD: 0.3 K/UL (ref 0.05–1.2)
MONOCYTES NFR BLD: 4 % (ref 3–10)
NEUTS SEG # BLD: 3.8 K/UL (ref 1.8–8)
NEUTS SEG NFR BLD: 51 % (ref 40–73)
P-R INTERVAL, ECG05: 148 MS
PLATELET # BLD AUTO: 200 K/UL (ref 135–420)
PMV BLD AUTO: 10.9 FL (ref 9.2–11.8)
POTASSIUM SERPL-SCNC: 3.5 MMOL/L (ref 3.5–5.5)
PROT SERPL-MCNC: 7.4 G/DL (ref 6.4–8.2)
PROTHROMBIN TIME: 13 SEC (ref 11.5–15.2)
Q-T INTERVAL, ECG07: 380 MS
QRS DURATION, ECG06: 80 MS
QTC CALCULATION (BEZET), ECG08: 502 MS
RBC # BLD AUTO: 4.51 M/UL (ref 4.2–5.3)
SODIUM SERPL-SCNC: 138 MMOL/L (ref 136–145)
TROPONIN I SERPL-MCNC: <0.02 NG/ML (ref 0–0.04)
VENTRICULAR RATE, ECG03: 105 BPM
WBC # BLD AUTO: 7.3 K/UL (ref 4.6–13.2)

## 2020-08-30 PROCEDURE — 71275 CT ANGIOGRAPHY CHEST: CPT

## 2020-08-30 PROCEDURE — 74011000636 HC RX REV CODE- 636: Performed by: EMERGENCY MEDICINE

## 2020-08-30 RX ADMIN — IOPAMIDOL 100 ML: 755 INJECTION, SOLUTION INTRAVENOUS at 04:12

## 2020-08-30 NOTE — ED TRIAGE NOTES
Patient brought by medic for chest burning for the past 2 days. Pt reports \"breast pain\" with radiating pain to left arm.

## 2020-08-30 NOTE — DISCHARGE INSTRUCTIONS
Patient Education        Breast Pain: Care Instructions  Your Care Instructions     Breast tenderness and pain may come and go with your monthly periods (cyclic), or it may not follow any pattern (noncyclic). Breast pain is rarely caused by a serious health problem. You may need tests to find the cause. Follow-up care is a key part of your treatment and safety. Be sure to make and go to all appointments, and call your doctor if you are having problems. It's also a good idea to know your test results and keep a list of the medicines you take. How can you care for yourself at home? · If your doctor gave you medicine, take it exactly as prescribed. Call your doctor if you think you are having a problem with your medicine. · Take an over-the-counter pain medicine, such as acetaminophen (Tylenol), ibuprofen (Advil, Motrin), or naproxen (Aleve), to relieve pain and swelling. Read and follow all instructions on the label. · Do not take two or more pain medicines at the same time unless the doctor told you to. Many pain medicines have acetaminophen, which is Tylenol. Too much acetaminophen (Tylenol) can be harmful. · Wear a supportive bra, such as a sports bra or a jog bra. · Cut down on the amount of fat in your diet. If you need help planning healthy meals, see a dietitian. · Get at least 30 minutes of exercise on most days of the week. Walking is a good choice. You also may want to do other activities, such as running, swimming, cycling, or playing tennis or team sports. · Keep a healthy sleep pattern. Go to bed at the same time every night, and get up at the same time every day. When should you call for help? Call your doctor now or seek immediate medical care if:  · You have new changes in a breast, such as:  ? A lump or thickening in your breast or armpit. ? A change in the breast's size or shape. ? Skin changes, such as dimples or puckers. ? Nipple discharge.   ? A change in the color or feel of the skin of your breast or the darker area around the nipple (areola). ? A change in the shape of the nipple (it may look like it's being pulled into the breast). · You have symptoms of a breast infection, such as:  ? Increased pain, swelling, redness, or warmth around a breast.  ? Red streaks extending from the breast.  ? Pus draining from a breast.  ? A fever. Watch closely for changes in your health, and be sure to contact your doctor if:  · Your breast pain does not get better after 1 week. · You have a lump or thickening in your breast or armpit. Where can you learn more? Go to http://rashawn-bre.info/  Enter Z395 in the search box to learn more about \"Breast Pain: Care Instructions. \"  Current as of: June 26, 2019               Content Version: 12.5  © 0429-8730 [employer]. Care instructions adapted under license by [employer] (which disclaims liability or warranty for this information). If you have questions about a medical condition or this instruction, always ask your healthcare professional. [address] any warranty or liability for your use of this information. Patient Education        Chest Pain: Care Instructions  Your Care Instructions     There are many things that can cause chest pain. Some are not serious and will get better on their own in a few days. But some kinds of chest pain need more testing and treatment. Your doctor may have recommended a follow-up visit in the next 8 to 12 hours. If you are not getting better, you may need more tests or treatment. Even though your doctor has released you, you still need to watch for any problems. The doctor carefully checked you, but sometimes problems can develop later. If you have new symptoms or if your symptoms do not get better, get medical care right away.   If you have worse or different chest pain or pressure that lasts more than 5 minutes or you passed out (lost consciousness), [id number] or seek other emergency help right away. A medical visit is only one step in your treatment. Even if you feel better, you still need to do what your doctor recommends, such as going to all suggested follow-up appointments and taking medicines exactly as directed. This will help you recover and help prevent future problems. How can you care for yourself at home? · Rest until you feel better. · Take your medicine exactly as prescribed. Call your doctor if you think you are having a problem with your medicine. · Do not drive after taking a prescription pain medicine. When should you call for help? [id number]:   · You passed out (lost consciousness). · You have severe difficulty breathing. · You have symptoms of a heart attack. These may include:  ? Chest pain or pressure, or a strange feeling in your chest.  ? Sweating. ? Shortness of breath. ? Nausea or vomiting. ? Pain, pressure, or a strange feeling in your back, neck, jaw, or upper belly or in one or both shoulders or arms. ? Lightheadedness or sudden weakness. ? A fast or irregular heartbeat. After you call 911, the  may tell you to chew 1 adult-strength or 2 to 4 low-dose aspirin. Wait for an ambulance. Do not try to drive yourself. Call your doctor today if:   · You have any trouble breathing. · Your chest pain gets worse. · You are dizzy or lightheaded, or you feel like you may faint. · You are not getting better as expected. · You are having new or different chest pain. Where can you learn more? Go to http://rashawn-bre.info/  Enter A120 in the search box to learn more about \"Chest Pain: Care Instructions. \"  Current as of: June 26, 2019               Content Version: 12.5  © 7390-5385 Healthwise, Incorporated. Care instructions adapted under license by [employer] (which disclaims liability or warranty for this information).  If you have questions about a medical condition or this instruction, always ask your healthcare professional. [place] [address] any warranty or liability for your use of this information.

## 2020-08-30 NOTE — ED PROVIDER NOTES
EMERGENCY DEPARTMENT HISTORY AND PHYSICAL EXAM    11:31 PM      Date: 8/29/2020  Patient Name: Neha Rodriguez    History of Presenting Illness     Chief Complaint   Patient presents with    Chest Pain         History Provided By: patient    Additional History (Context): Neha Rodriguez is a 60 y.o. female presents with 3 of diabetes and high blood pressure, and DVT on anticoagulation. , she does NOT voice a history of coronary artery disease she has had 3 days of pain in her bilateral breasts burning sensation better if she holds them. No exertional component. Confusing story according to the paramedics she complained of chest pain radiating to the left arm and the jaw better with 2 nitro aspirin given. Noted to be tachycardic on twelve-lead. Peacham Hind PCP: UNKNOWN    Chief Complaint:   Duration:    Timing:    Location:   Quality:   Severity:   Modifying Factors:   Associated Symptoms:       Current Facility-Administered Medications   Medication Dose Route Frequency Provider Last Rate Last Dose    morphine injection 2 mg  2 mg IntraVENous ONCE Adair Baires MD         Current Outpatient Medications   Medication Sig Dispense Refill    aspirin (ASPIRIN) 325 mg tablet Take 1 Tab by mouth daily. 10 Tab 0    CYCLOBENZAPRINE HCL (CYCLOBENZAPRINE PO) Take  by mouth.  SIMVASTATIN PO Take  by mouth.  fluticasone-salmeterol (ADVAIR) 100-50 mcg/dose diskus inhaler Take 1 Puff by inhalation every twelve (12) hours.  insulin NPH/insulin regular (NOVOLIN 70/30, HUMULIN 70/30) 100 unit/mL (70-30) injection by SubCUTAneous route.  metFORMIN (GLUCOPHAGE) 500 mg tablet Take 1 Tab by mouth daily (with breakfast). 30 Tab 0    oxyCODONE-acetaminophen (PERCOCET) 5-325 mg per tablet Take 1 Tab by mouth every four (4) hours as needed for Pain. 8 Tab 0    Olmesartan-Amlodipine-HCTZ (TRIBENZOR) 40-5-12.5 mg Tab Take  by mouth.  lisinopril (PRINIVIL, ZESTRIL) 5 mg tablet Take  by mouth daily.         furosemide (LASIX) 40 mg tablet Take  by mouth daily.  ferrous sulf-fa-b comp with c (MULTIRET FOLIC) 350 (975)-5.8 mg TbER Take 1 Tab by mouth daily. 90 Tab 0    albuterol (PROVENTIL VENTOLIN) 2.5 mg /3 mL (0.083 %) nebulizer solution 5 mg by Nebulization route once. Past History     Past Medical History:  Past Medical History:   Diagnosis Date    Anemia     Anemia NEC     Arthritis     Asthma     Chest pain, unspecified     Possible GERD, normal NUC Stress test    Chronic diastolic heart failure (HCC)     Normal EF, DD    Essential hypertension, benign     Better controlled    Hypercholesteremia     Hypertension     Migraines     Other and unspecified hyperlipidemia     Shortness of breath     Possible CHF, asthma, COPD    Thromboembolus ([address]        Past Surgical History:  Past Surgical History:   Procedure Laterality Date    HX GYN      4 c-sections       Family History:  Family History   Problem Relation Age of Onset    Heart Disease Other         positive family history for heart disease    Diabetes Mother     Cancer Mother     Cancer Father     Diabetes Sister     Diabetes Paternal Grandmother        Social History:  Social History     Tobacco Use    Smoking status: Current Some Day Smoker   Substance Use Topics    Alcohol use: No    Drug use: No     Comment: reports hx of crack, cocaine and thc \"5 years ago\"       Allergies: Allergies   Allergen Reactions    Pcn [Penicillins] Hives and Swelling    Penicillins Hives         Review of Systems     Review of Systems   Constitutional: Negative for diaphoresis and fever. HENT: Negative for congestion and sore throat. Eyes: Negative for pain and itching. Respiratory: Negative for cough and shortness of breath. Cardiovascular: Positive for chest pain. Negative for palpitations. Gastrointestinal: Negative for abdominal pain and diarrhea. Endocrine: Negative for polydipsia and polyuria.    Genitourinary: Negative for dysuria and hematuria. Musculoskeletal: Negative for arthralgias and myalgias. Skin: Negative for rash and wound. Neurological: Negative for seizures and syncope. Hematological: Does not bruise/bleed easily. Psychiatric/Behavioral: Negative for agitation and hallucinations. Physical Exam       Patient Vitals for the past 12 hrs:   Temp Pulse Resp BP SpO2   08/30/20 0100  97  106/66 96 %   08/29/20 2345 98.6 °F (37 °C) 100 22 108/60 96 %       Physical Exam  Vitals signs and nursing note reviewed. Constitutional:       Appearance: She is well-developed. She is not toxic-appearing. Comments: Very anxious, agitated   HENT:      Head: Normocephalic and atraumatic. Eyes:      General: No scleral icterus. Conjunctiva/sclera: Conjunctivae normal.   Neck:      Musculoskeletal: Normal range of motion and neck supple. Vascular: No JVD. Cardiovascular:      Rate and Rhythm: Regular rhythm. Tachycardia present. Heart sounds: Normal heart sounds. Comments: 4 intact extremity pulses, chest tenderness is present  Pulmonary:      Effort: Pulmonary effort is normal.      Breath sounds: Normal breath sounds. Abdominal:      Palpations: Abdomen is soft. There is no mass. Tenderness: There is no abdominal tenderness. Musculoskeletal: Normal range of motion. Lymphadenopathy:      Cervical: No cervical adenopathy. Skin:     General: Skin is warm and dry. Neurological:      Mental Status: She is alert.            Diagnostic Study Results   Labs -  Recent Results (from the past 12 hour(s))   EKG, 12 LEAD, INITIAL    Collection Time: 08/29/20 11:32 PM   Result Value Ref Range    Ventricular Rate 105 BPM    Atrial Rate 105 BPM    P-R Interval 148 ms    QRS Duration 80 ms    Q-T Interval 380 ms    QTC Calculation (Bezet) 502 ms    Calculated P Axis 69 degrees    Calculated R Axis -5 degrees    Calculated T Axis 85 degrees    Diagnosis       Sinus tachycardia  Otherwise normal ECG  When compared with ECG of 22-APR-2018 22:52,  Non-specific change in ST segment in Lateral leads     CBC WITH AUTOMATED DIFF    Collection Time: 08/29/20 11:42 PM   Result Value Ref Range    WBC 7.3 4.6 - 13.2 K/uL    RBC 4.51 4.20 - 5.30 M/uL    HGB 14.1 12.0 - 16.0 g/dL    HCT 41.7 35.0 - 45.0 %    MCV 92.5 74.0 - 97.0 FL    MCH 31.3 24.0 - 34.0 PG    MCHC 33.8 31.0 - 37.0 g/dL    RDW 12.3 11.6 - 14.5 %    PLATELET 064 080 - 224 K/uL    MPV 10.9 9.2 - 11.8 FL    NEUTROPHILS 51 40 - 73 %    LYMPHOCYTES 44 21 - 52 %    MONOCYTES 4 3 - 10 %    EOSINOPHILS 1 0 - 5 %    BASOPHILS 0 0 - 2 %    ABS. NEUTROPHILS 3.8 1.8 - 8.0 K/UL    ABS. LYMPHOCYTES 3.3 0.9 - 3.6 K/UL    ABS. MONOCYTES 0.3 0.05 - 1.2 K/UL    ABS. EOSINOPHILS 0.1 0.0 - 0.4 K/UL    ABS. BASOPHILS 0.0 0.0 - 0.1 K/UL    DF AUTOMATED     TROPONIN I    Collection Time: 08/29/20 11:42 PM   Result Value Ref Range    Troponin-I, QT <0.02 0.0 - 2.964 NG/ML   METABOLIC PANEL, COMPREHENSIVE    Collection Time: 08/29/20 11:42 PM   Result Value Ref Range    Sodium 138 136 - 145 mmol/L    Potassium 3.5 3.5 - 5.5 mmol/L    Chloride 108 100 - 111 mmol/L    CO2 22 21 - 32 mmol/L    Anion gap 8 3.0 - 18 mmol/L    Glucose 254 (H) 74 - 99 mg/dL    BUN 10 7.0 - 18 MG/DL    Creatinine 0.58 (L) 0.6 - 1.3 MG/DL    BUN/Creatinine ratio 17 12 - 20      GFR est AA >60 >60 ml/min/1.73m2    GFR est non-AA >60 >60 ml/min/1.73m2    Calcium 8.8 8.5 - 10.1 MG/DL    Bilirubin, total 0.3 0.2 - 1.0 MG/DL    ALT (SGPT) 20 13 - 56 U/L    AST (SGOT) 19 10 - 38 U/L    Alk.  phosphatase 115 45 - 117 U/L    Protein, total 7.4 6.4 - 8.2 g/dL    Albumin 3.4 3.4 - 5.0 g/dL    Globulin 4.0 2.0 - 4.0 g/dL    A-G Ratio 0.9 0.8 - 1.7     PROTHROMBIN TIME + INR    Collection Time: 08/29/20 11:42 PM   Result Value Ref Range    Prothrombin time 13.0 11.5 - 15.2 sec    INR 1.0 0.8 - 1.2     PTT    Collection Time: 08/29/20 11:42 PM   Result Value Ref Range    aPTT 29.1 23.0 - 36.4 SEC       Radiologic Studies -   CTA CHEST W OR W WO CONT   Final Result   IMPRESSION:   No evidence for pulmonary emboli. Central bronchial wall thickening. Mild nonspecific mediastinal and hilar lymph node enlargement. Cta Chest W Or W Wo Cont    Result Date: 8/30/2020  CTA CHEST PULMONARY EMBOLISM PROTOCOL  INDICATION: Chest pain. History of DVT. Question pulmonary embolism. TECHNIQUE: Thin collimation axial images obtained through the level of the pulmonary arteries with additional imaging through the chest following the uneventful administration of 100 cc Isovue-370 nonionic intravenous contrast. Images reconstructed into three dimensional coronal and sagittal projections for complete evaluation of the tortuous and overlapping pulmonary vascular structures and to reduce patient radiation dose. All CT scans at this facility are performed using dose optimization technique as appropriate to a performed exam, to include automated exposure control, adjustment of the mA and/or kV according to patient size (including appropriate matching first site-specific examinations), or use of iterative reconstruction technique. COMPARISON: None. FINDINGS: No filling defects are appreciated within the main, left, right, lobar or visualized segmental pulmonary arteries to suggest embolism. Thyroid: Right thyroid calcification. Pericardium/ Heart: Heart is mildly enlarged. No significant pericardial effusion. Aorta/ Vessels: No aneurysm or dissection. Lymph Nodes: There are prominent mediastinal and bilateral hilar lymph nodes noted. . Lungs: Central bronchial wall thickening. Bibasilar atelectasis. No consolidative pneumonia or pulmonary edema. Upper Abdomen: Unremarkable. Bones/soft tissues: Unremarkable. IMPRESSION: No evidence for pulmonary emboli. Central bronchial wall thickening. Mild nonspecific mediastinal and hilar lymph node enlargement.        Medications ordered:   Medications   morphine injection 2 mg (has no administration in time range)   iopamidoL (ISOVUE-370) 76 % injection  mL (100 mL IntraVENous Given 8/30/20 0412)         Medical Decision Making   Initial Medical Decision Making and DDx:     Confusing story, prehospital EKG was tachycardic otherwise benign. Will evaluate for ACS, PE. Possible stimulant intoxication given her agitation. ED Course: Progress Notes, Reevaluation, and Consults:     Twelve-lead EKG at 2332, sinus tachycardia 105 no acute process    6:44 AM  No alarming events during her stay in the ER on reassessment benign appearance she is happy pain is spontaneously resolved she did not even get her morphine. Unclear cause of the pain. With 3 days of pain 1 troponin is adequate to rule out. CT is negative for PE. I am the first provider for this patient. I reviewed the vital signs, available nursing notes, past medical history, past surgical history, family history and social history. Patient Vitals for the past 12 hrs:   Temp Pulse Resp BP SpO2   08/30/20 0100  97  106/66 96 %   08/29/20 2345 98.6 °F (37 °C) 100 22 108/60 96 %       Vital Signs-Reviewed the patient's vital signs. Pulse Oximetry Analysis, Cardiac Monitor, 12 lead ekg: No hypoxia  Interpreted by the EP. Records Reviewed: Nursing notes reviewed (Time of Review: 11:31 PM)    Procedures:   Critical Care Time:   Aspirin: (was aspirin given for stroke?)    Diagnosis     Clinical Impression:   1. Breast pain    2.  Chest pain, unspecified type        Disposition: Discharged      Follow-up Information     Follow up With Specialties Details Why [address] [address] [phone]           Patient's Medications   Start Taking    No medications on file   Continue Taking    ALBUTEROL (PROVENTIL VENTOLIN) 2.5 MG /3 ML (0.083 %) NEBULIZER SOLUTION    5 mg by Nebulization route once. ASPIRIN (ASPIRIN) 325 MG TABLET    Take 1 Tab by mouth daily. CYCLOBENZAPRINE HCL (CYCLOBENZAPRINE PO)    Take  by mouth. FERROUS SULF-FA-B COMP WITH C (MULTIRET FOLIC) 578 (370)-3.4 MG TBER    Take 1 Tab by mouth daily. FLUTICASONE-SALMETEROL (ADVAIR) 100-50 MCG/DOSE DISKUS INHALER    Take 1 Puff by inhalation every twelve (12) hours. FUROSEMIDE (LASIX) 40 MG TABLET    Take  by mouth daily. INSULIN NPH/INSULIN REGULAR (NOVOLIN 70/30, HUMULIN 70/30) 100 UNIT/ML (70-30) INJECTION    by SubCUTAneous route. LISINOPRIL (PRINIVIL, ZESTRIL) 5 MG TABLET    Take  by mouth daily. METFORMIN (GLUCOPHAGE) 500 MG TABLET    Take 1 Tab by mouth daily (with breakfast). OLMESARTAN-AMLODIPINE-HCTZ (TRIBENZOR) 40-5-12.5 MG TAB    Take  by mouth. OXYCODONE-ACETAMINOPHEN (PERCOCET) 5-325 MG PER TABLET    Take 1 Tab by mouth every four (4) hours as needed for Pain. SIMVASTATIN PO    Take  by mouth.    These Medications have changed    No medications on file   Stop Taking    No medications on file     _______________________________    Notes:    Lauren Chavez MD using Dragon dictation      _______________________________

## 2022-02-02 ENCOUNTER — APPOINTMENT (OUTPATIENT)
Dept: GENERAL RADIOLOGY | Age: 57
End: 2022-02-02
Attending: STUDENT IN AN ORGANIZED HEALTH CARE EDUCATION/TRAINING PROGRAM
Payer: MEDICAID

## 2022-02-02 ENCOUNTER — APPOINTMENT (OUTPATIENT)
Dept: CT IMAGING | Age: 57
End: 2022-02-02
Attending: STUDENT IN AN ORGANIZED HEALTH CARE EDUCATION/TRAINING PROGRAM
Payer: MEDICAID

## 2022-02-02 ENCOUNTER — HOSPITAL ENCOUNTER (EMERGENCY)
Age: 57
Discharge: HOME | End: 2022-02-02
Attending: STUDENT IN AN ORGANIZED HEALTH CARE EDUCATION/TRAINING PROGRAM
Payer: MEDICAID

## 2022-02-02 VITALS
SYSTOLIC BLOOD PRESSURE: 129 MMHG | TEMPERATURE: 98.1 F | RESPIRATION RATE: 20 BRPM | HEART RATE: 98 BPM | DIASTOLIC BLOOD PRESSURE: 75 MMHG | OXYGEN SATURATION: 98 %

## 2022-02-02 DIAGNOSIS — R73.9 HYPERGLYCEMIA: ICD-10-CM

## 2022-02-02 DIAGNOSIS — R53.1 ACUTE LEFT-SIDED WEAKNESS: Primary | ICD-10-CM

## 2022-02-02 DIAGNOSIS — R20.0 LEFT SIDED NUMBNESS: ICD-10-CM

## 2022-02-02 DIAGNOSIS — I69.30 HISTORY OF CVA WITH RESIDUAL DEFICIT: ICD-10-CM

## 2022-02-02 DIAGNOSIS — R55 SYNCOPE AND COLLAPSE: ICD-10-CM

## 2022-02-02 LAB
ANION GAP SERPL CALC-SCNC: 9 MMOL/L (ref 3–18)
ATRIAL RATE: 95 BPM
BASOPHILS # BLD: 0 K/UL (ref 0–0.1)
BASOPHILS NFR BLD: 1 % (ref 0–2)
BUN SERPL-MCNC: 6 MG/DL (ref 7–18)
BUN/CREAT SERPL: 11 (ref 12–20)
CALCIUM SERPL-MCNC: 9.8 MG/DL (ref 8.5–10.1)
CALCULATED P AXIS, ECG09: 58 DEGREES
CALCULATED R AXIS, ECG10: 8 DEGREES
CALCULATED T AXIS, ECG11: 72 DEGREES
CHLORIDE SERPL-SCNC: 101 MMOL/L (ref 100–111)
CO2 SERPL-SCNC: 25 MMOL/L (ref 21–32)
CREAT SERPL-MCNC: 0.57 MG/DL (ref 0.6–1.3)
DIAGNOSIS, 93000: NORMAL
DIFFERENTIAL METHOD BLD: NORMAL
EOSINOPHIL # BLD: 0.1 K/UL (ref 0–0.4)
EOSINOPHIL NFR BLD: 1 % (ref 0–5)
ERYTHROCYTE [DISTWIDTH] IN BLOOD BY AUTOMATED COUNT: 12.7 % (ref 11.6–14.5)
GLUCOSE BLD STRIP.AUTO-MCNC: 209 MG/DL (ref 70–110)
GLUCOSE SERPL-MCNC: 238 MG/DL (ref 74–99)
HCT VFR BLD AUTO: 42.8 % (ref 35–45)
HGB BLD-MCNC: 14.5 G/DL (ref 12–16)
IMM GRANULOCYTES # BLD AUTO: 0 K/UL (ref 0–0.04)
IMM GRANULOCYTES NFR BLD AUTO: 0 % (ref 0–0.5)
INR PPP: 0.9 (ref 0.8–1.2)
LYMPHOCYTES # BLD: 2.5 K/UL (ref 0.9–3.6)
LYMPHOCYTES NFR BLD: 46 % (ref 21–52)
MCH RBC QN AUTO: 30.3 PG (ref 24–34)
MCHC RBC AUTO-ENTMCNC: 33.9 G/DL (ref 31–37)
MCV RBC AUTO: 89.4 FL (ref 78–100)
MONOCYTES # BLD: 0.4 K/UL (ref 0.05–1.2)
MONOCYTES NFR BLD: 7 % (ref 3–10)
NEUTS SEG # BLD: 2.5 K/UL (ref 1.8–8)
NEUTS SEG NFR BLD: 45 % (ref 40–73)
NRBC # BLD: 0 K/UL (ref 0–0.01)
NRBC BLD-RTO: 0 PER 100 WBC
P-R INTERVAL, ECG05: 154 MS
PLATELET # BLD AUTO: 240 K/UL (ref 135–420)
PMV BLD AUTO: 9.7 FL (ref 9.2–11.8)
POTASSIUM SERPL-SCNC: 3.9 MMOL/L (ref 3.5–5.5)
PROTHROMBIN TIME: 12.4 SEC (ref 11.5–15.2)
Q-T INTERVAL, ECG07: 396 MS
QRS DURATION, ECG06: 82 MS
QTC CALCULATION (BEZET), ECG08: 497 MS
RBC # BLD AUTO: 4.79 M/UL (ref 4.2–5.3)
SODIUM SERPL-SCNC: 135 MMOL/L (ref 136–145)
TROPONIN-HIGH SENSITIVITY: 14 NG/L (ref 0–54)
VENTRICULAR RATE, ECG03: 95 BPM
WBC # BLD AUTO: 5.5 K/UL (ref 4.6–13.2)

## 2022-02-02 PROCEDURE — 80048 BASIC METABOLIC PNL TOTAL CA: CPT

## 2022-02-02 PROCEDURE — 85025 COMPLETE CBC W/AUTO DIFF WBC: CPT

## 2022-02-02 PROCEDURE — 82962 GLUCOSE BLOOD TEST: CPT

## 2022-02-02 PROCEDURE — 85610 PROTHROMBIN TIME: CPT

## 2022-02-02 PROCEDURE — 71045 X-RAY EXAM CHEST 1 VIEW: CPT

## 2022-02-02 PROCEDURE — 70450 CT HEAD/BRAIN W/O DYE: CPT

## 2022-02-02 PROCEDURE — 93005 ELECTROCARDIOGRAM TRACING: CPT

## 2022-02-02 PROCEDURE — 84484 ASSAY OF TROPONIN QUANT: CPT

## 2022-02-02 PROCEDURE — 99285 EMERGENCY DEPT VISIT HI MDM: CPT

## 2022-02-02 NOTE — ED PROVIDER NOTES
EMERGENCY DEPARTMENT HISTORY AND PHYSICAL EXAM      Date: (Not on file)  Patient Name: Munir Reis    History of Presenting Illness     No chief complaint on file. History (Context): Munir Reis is a 60 y.o. female with a past medical history significant for previous CVA with left-sided deficits, hyperlipidemia, hypertension, migraines, arthritis, asthma, heart failure comes into the ED today due to left-sided weakness and numbness with chest pain and syncopal episode. Patient states that she began to have numbness to the left upper extremity that began around 5 PM last night however stated that approximately 1 hour ago she began to have significant chest pain located to the left side and further left upper and lower extremity weakness. She states symptoms are similar to when she had previous CVA. Patient states following symptom onset she attempted to go outside to call for help and had a syncopal episode that was witnessed by her neighbor. Patient states that she has right-sided head pain at this time secondary to the syncopal episode. She continues to endorse left-sided weakness from baseline. His symptoms have not improved or worsened since onset. She denies any dysarthria. She is not taking any medication for treatment of her symptoms prior to arrival. She describes her symptoms as severe in quality. PCP: Unknown (Inactive)    Current Outpatient Medications   Medication Sig Dispense Refill    aspirin (ASPIRIN) 325 mg tablet Take 1 Tab by mouth daily. 10 Tab 0    CYCLOBENZAPRINE HCL (CYCLOBENZAPRINE PO) Take  by mouth.  SIMVASTATIN PO Take  by mouth.  fluticasone-salmeterol (ADVAIR) 100-50 mcg/dose diskus inhaler Take 1 Puff by inhalation every twelve (12) hours.  insulin NPH/insulin regular (NOVOLIN 70/30, HUMULIN 70/30) 100 unit/mL (70-30) injection by SubCUTAneous route.  metFORMIN (GLUCOPHAGE) 500 mg tablet Take 1 Tab by mouth daily (with breakfast).  30 Tab 0    oxyCODONE-acetaminophen (PERCOCET) 5-325 mg per tablet Take 1 Tab by mouth every four (4) hours as needed for Pain. 8 Tab 0    Olmesartan-Amlodipine-HCTZ (TRIBENZOR) 40-5-12.5 mg Tab Take  by mouth.  lisinopril (PRINIVIL, ZESTRIL) 5 mg tablet Take  by mouth daily.  furosemide (LASIX) 40 mg tablet Take  by mouth daily.  ferrous sulf-fa-b comp with c (MULTIRET FOLIC) 638 (638)-6.5 mg TbER Take 1 Tab by mouth daily. 90 Tab 0    albuterol (PROVENTIL VENTOLIN) 2.5 mg /3 mL (0.083 %) nebulizer solution 5 mg by Nebulization route once. Past History     Past Medical History:   Past Medical History:   Diagnosis Date    Anemia     Anemia NEC     Arthritis     Asthma     Chest pain, unspecified     Possible GERD, normal NUC Stress test    Chronic diastolic heart failure (HCC)     Normal EF, DD    Essential hypertension, benign     Better controlled    Hypercholesteremia     Hypertension     Migraines     Other and unspecified hyperlipidemia     Shortness of breath     Possible CHF, asthma, COPD    Thromboembolus ([address]        Past Surgical History:  Past Surgical History:   Procedure Laterality Date    HX GYN      4 c-sections       Family History:  Family History   Problem Relation Age of Onset    Heart Disease Other         positive family history for heart disease    Diabetes Mother     Cancer Mother     Cancer Father     Diabetes Sister     Diabetes Paternal Grandmother        Social History:   Social History     Tobacco Use    Smoking status: Current Some Day Smoker    Smokeless tobacco: Not on file   Substance Use Topics    Alcohol use: No    Drug use: No     Comment: reports hx of crack, cocaine and thc \"5 years ago\"       Allergies: Allergies   Allergen Reactions    Pcn [Penicillins] Hives and Swelling    Penicillins Hives       PMH, PSH, family history, social history, allergies reviewed with the patient with significant items noted above.   Review of Systems Review of Systems   Constitutional: Negative for chills and fever. HENT: Negative for sore throat. Eyes: Negative for visual disturbance. Respiratory: Negative for shortness of breath. Cardiovascular: Positive for chest pain. Gastrointestinal: Negative for abdominal pain, nausea and vomiting. Genitourinary: Negative for difficulty urinating. Musculoskeletal: Negative for myalgias. Skin: Negative for rash. Neurological: Positive for syncope, weakness, numbness and headaches. Negative for speech difficulty and light-headedness. Physical Exam   There were no vitals filed for this visit. Physical Exam  Vitals and nursing note reviewed. Constitutional:       General: She is not in acute distress. Appearance: Normal appearance. She is not ill-appearing or toxic-appearing. HENT:      Head: Normocephalic and atraumatic. Mouth/Throat:      Mouth: Mucous membranes are moist.   Eyes:      General: No scleral icterus. Extraocular Movements: Extraocular movements intact. Conjunctiva/sclera: Conjunctivae normal.      Pupils: Pupils are equal, round, and reactive to light. Cardiovascular:      Rate and Rhythm: Normal rate and regular rhythm. Comments: Normal peripheral perfusion  Pulmonary:      Effort: Pulmonary effort is normal. No respiratory distress. Abdominal:      General: There is no distension. Palpations: Abdomen is soft. Tenderness: There is no abdominal tenderness. There is no guarding or rebound. Musculoskeletal:         General: No tenderness or deformity. Normal range of motion. Cervical back: Normal range of motion and neck supple. Right lower leg: No edema. Left lower leg: No edema. Skin:     General: Skin is warm and dry. Findings: No rash. Neurological:      General: No focal deficit present. Mental Status: She is alert and oriented to person, place, and time. Mental status is at baseline.       Cranial Nerves: No cranial nerve deficit. Sensory: No sensory deficit. Motor: Weakness (? LUE and LLE weakness when compared to the right but unsure if this is at baseline or not) present. Psychiatric:         Mood and Affect: Mood normal.         Thought Content: Thought content normal.         Diagnostic Study Results     Labs -   No results found for this or any previous visit (from the past 12 hour(s)). Labs Reviewed   CBC WITH AUTOMATED DIFF   METABOLIC PANEL, BASIC   PROTHROMBIN TIME + INR   TROPONIN-HIGH SENSITIVITY   URINALYSIS W/ RFLX MICROSCOPIC   POC PT/INR       Radiologic Studies -   CT HEAD WO CONT    (Results Pending)   XR CHEST PORT    (Results Pending)     CT Results  (Last 48 hours)    None        CXR Results  (Last 48 hours)    None          The laboratory results, imaging results, and other diagnostic exams were reviewed in the EMR. Medical Decision Making   I am the first provider for this patient. I reviewed the vital signs, available nursing notes, past medical history, past surgical history, family history and social history. Vital Signs-Reviewed the patient's vital signs. ED EKG interpretation:  Rhythm: normal sinus rhythm; and regular . Rate (approx.): 95; Axis: left axis deviation; P wave: normal; QRS interval: normal ; ST/T wave: normal; Other findings: borderline ekg. This EKG was interpreted by Gina Cummings D.O. Records Reviewed: Personally, on initial evaluation    MDM:   Dariusz Thompson presents with complaint of left upper and lower extremity numbness and weakness, syncopal episode  DDX includes but is not limited to: Syncope, CVA, hypoglycemia    Patient overall well-appearing, no acute distress, and vital signs grossly within normal limits. Will call a stroke alert at this time despite patient being a poor historian as she may be inside TPA window however do not feel she is a good candidate.  Will obtain lab work and imaging for further evaluation of patients complaint. Will continue to monitor and evaluate patient while in the ED. Orders as below:  Orders Placed This Encounter    CT HEAD WO CONT    XR CHEST PORT    CBC w/ Auto Diff    Basic Metabolic Panel (BMP)    Lab PT/INR    TROPONIN-HIGH SENSITIVITY    URINALYSIS W/ RFLX MICROSCOPIC    DIET NPO    NURSING-MISCELLANEOUS: Activate CODE STROKE ONE TIME    Perform NIH Stroke Scale    Nursing Dysphagia Screen (STAND)    POC GLUCOSE    NEURO CHECKS q1h x 4 hours   [facility] Neurology    POC PT/INR    Initial ECG    Consult Tele-Neurology [facility])    Consult  TELE-NEUROLOGY        ED Course:   ED Course as of 02/02/22 0629 Wed Feb 02, 2022   0204 Spoke with neurology who will evaluate patient following CT scan. [DV]   0210 Patient CT scan of the head does not show any acute intracranial hemorrhage or abnormalities per radiology. We will continue to monitor patient. [DV]   2493 Per neurology patient does not require a CTA for further evaluation. NIH stroke scale of 1 and not recommending TPA at this time. Recommended further evaluation for her symptoms. We will continue to monitor patient. [DV]   0422 Patient states that she feels back to baseline. Without any symptoms at this time. Patient's chest x-ray does not show any acute cardiopulmonary abnormalities. We will continue to monitor patient  And obtain orthostatic blood pressures for further evaluation. [DV]   [id number] Patient not found to have orthostatic hypotension. Will discharge patient home with return precautions and follow-up recommendations. Patient verbalized understanding is without any further questions. [DV]      ED Course User Index  [DV] Jacklyn Toure DO           Procedures:  Procedures        Diagnosis and Disposition     CLINICAL IMPRESSION:  No diagnosis found.   Current Discharge Medication List          Disposition: Home    Patient condition at time of disposition: Stable    DISCHARGE NOTE:   Pt has been reexamined. Patient has no new complaints, changes, or physical findings. Care plan outlined and precautions discussed. Results were reviewed with the patient. All medications were reviewed with the patient. All of pt's questions and concerns were addressed. Alarm symptoms and return precautions associated with chief complaint and evaluation were reviewed with the patient in detail. The patient demonstrated adequate understanding. Patient was instructed to follow up with PCP, as well as strict return precautions to the ED upon further deterioration. Patient is ready to go home. The patient is happy with this plan          Dragon Disclaimer     Please note that this dictation was completed with [employer], the computer voice recognition software. Quite often unanticipated grammatical, syntax, homophones, and other interpretive errors are inadvertently transcribed by the computer software. Please disregard these errors. Please excuse any errors that have escaped final proofreading. Manjula REAVES.

## 2022-02-02 NOTE — ED NOTES
Patient comes in complaining of chest pain and left sided weakness. Patient states that she also had an episode where she felt lightheaded and dizzy and then passed out and her neighbor found her and called 911. Patient states that she does not remember how long she was down for.

## 2022-03-18 PROBLEM — R53.1 RIGHT SIDED WEAKNESS: Status: ACTIVE | Noted: 2018-04-17

## 2022-03-19 PROBLEM — E11.65 TYPE 2 DIABETES MELLITUS WITH HYPERGLYCEMIA (HCC): Status: ACTIVE | Noted: 2018-04-17

## 2022-03-19 PROBLEM — I63.9 CVA (CEREBRAL VASCULAR ACCIDENT) (HCC): Status: ACTIVE | Noted: 2018-04-17

## 2022-03-20 PROBLEM — I10 ESSENTIAL HYPERTENSION: Status: ACTIVE | Noted: 2018-04-17

## 2022-12-20 ENCOUNTER — TRANSCRIBE ORDER (OUTPATIENT)
Dept: SCHEDULING | Age: 57
End: 2022-12-20

## 2022-12-20 DIAGNOSIS — Z12.39 SCREENING FOR BREAST CANCER: Primary | ICD-10-CM

## 2022-12-20 DIAGNOSIS — N64.4 BREAST PAIN: ICD-10-CM

## 2023-11-02 ENCOUNTER — HOSPITAL ENCOUNTER (EMERGENCY)
Facility: HOSPITAL | Age: 58
Discharge: LEFT BEFORE DISCHARGE | End: 2023-11-02
Attending: STUDENT IN AN ORGANIZED HEALTH CARE EDUCATION/TRAINING PROGRAM
Payer: COMMERCIAL

## 2023-11-02 ENCOUNTER — APPOINTMENT (OUTPATIENT)
Facility: HOSPITAL | Age: 58
End: 2023-11-02
Payer: COMMERCIAL

## 2023-11-02 VITALS
WEIGHT: 170 LBS | BODY MASS INDEX: 32.1 KG/M2 | TEMPERATURE: 98.3 F | RESPIRATION RATE: 22 BRPM | HEART RATE: 98 BPM | SYSTOLIC BLOOD PRESSURE: 141 MMHG | DIASTOLIC BLOOD PRESSURE: 84 MMHG | HEIGHT: 61 IN | OXYGEN SATURATION: 97 %

## 2023-11-02 DIAGNOSIS — R07.9 CHEST PAIN, UNSPECIFIED TYPE: Primary | ICD-10-CM

## 2023-11-02 LAB
EKG ATRIAL RATE: 100 BPM
EKG ATRIAL RATE: 103 BPM
EKG DIAGNOSIS: NORMAL
EKG DIAGNOSIS: NORMAL
EKG P AXIS: 68 DEGREES
EKG P AXIS: 71 DEGREES
EKG P-R INTERVAL: 152 MS
EKG P-R INTERVAL: 160 MS
EKG Q-T INTERVAL: 392 MS
EKG Q-T INTERVAL: 394 MS
EKG QRS DURATION: 84 MS
EKG QRS DURATION: 84 MS
EKG QTC CALCULATION (BAZETT): 505 MS
EKG QTC CALCULATION (BAZETT): 516 MS
EKG R AXIS: 10 DEGREES
EKG R AXIS: 18 DEGREES
EKG T AXIS: 74 DEGREES
EKG T AXIS: 79 DEGREES
EKG VENTRICULAR RATE: 100 BPM
EKG VENTRICULAR RATE: 103 BPM

## 2023-11-02 PROCEDURE — 93010 ELECTROCARDIOGRAM REPORT: CPT | Performed by: INTERNAL MEDICINE

## 2023-11-02 PROCEDURE — 94760 N-INVAS EAR/PLS OXIMETRY 1: CPT

## 2023-11-02 PROCEDURE — 93005 ELECTROCARDIOGRAM TRACING: CPT

## 2023-11-02 PROCEDURE — 93005 ELECTROCARDIOGRAM TRACING: CPT | Performed by: STUDENT IN AN ORGANIZED HEALTH CARE EDUCATION/TRAINING PROGRAM

## 2023-11-02 PROCEDURE — 99284 EMERGENCY DEPT VISIT MOD MDM: CPT

## 2023-11-02 ASSESSMENT — ENCOUNTER SYMPTOMS
ABDOMINAL PAIN: 0
VOMITING: 0
CHEST TIGHTNESS: 1
NAUSEA: 0
SHORTNESS OF BREATH: 1

## 2023-11-02 ASSESSMENT — PAIN - FUNCTIONAL ASSESSMENT: PAIN_FUNCTIONAL_ASSESSMENT: NONE - DENIES PAIN

## 2023-11-02 NOTE — ED NOTES
Assumed care of pt. Pt brought in via EMS with c/o CP. Denies pain at this time. Received 1 nitro and 324 ASA en route. Pt states she went to doc this AM due to not feeling well since this AM. A&Ox4. Placed on full monitor. EKG done and shown to MD. PIV placed by EMS. Labs drawn and sent to lab.       Idris Thorpe, [place]  11/02/23 3518

## 2023-11-02 NOTE — ED PROVIDER NOTES
development assisted by: Abided by patient    - Severe exacerbation or progression of chronic illness: Chest pain    - Comorbidities impacting Evaluation and Management: Hypertension    - Threat to body function without evaluation and management: Cardio, neuro    - SOCIAL DETERMINANTS  impacting Evaluation and Management:Stress , Social Connections -grandchildren at risk of being entered into the foster care system      Procedures       Final Diagnosis   No diagnosis found. Disposition   Patient left AMA and declined further care. Patient left before she could be provided with after visit summary. Attempted to contact patient to provide her with cardiology referral phone number, but was unsuccessful. Manasa Martin M.D. Emergency Medicine PGY-1  [facility]  November 2, 2023  9:38 AM    Patient seen with Attending, Dr. Americo Guevara      Please note that portions of this note were completed with a voice recognition program.  Efforts were made to edit the dictations but occasionally words are mis-transcribed.           Vaughn Perez MD  Resident  11/02/23 [id number]

## 2023-11-02 NOTE — ED TRIAGE NOTES
Patient arrived via EMS with c/o chest pain, patient given 1 nitro and 324 of asa.  Bg 208 upon arrival the denies any chest pain

## 2023-11-02 NOTE — ED NOTES
Pt stating that she wants to sign out AMA due to family complications. MD to see pt. Pt verbalized understanding of AMA paperwork. Still wants to sign out. PIV removed.       Eulalia Kaur  11/02/23 1015